# Patient Record
Sex: FEMALE | HISPANIC OR LATINO | Employment: FULL TIME | URBAN - METROPOLITAN AREA
[De-identification: names, ages, dates, MRNs, and addresses within clinical notes are randomized per-mention and may not be internally consistent; named-entity substitution may affect disease eponyms.]

---

## 2018-11-03 ENCOUNTER — HOSPITAL ENCOUNTER (OUTPATIENT)
Dept: LAB | Facility: MEDICAL CENTER | Age: 53
End: 2018-11-03
Attending: OBSTETRICS & GYNECOLOGY
Payer: COMMERCIAL

## 2018-11-03 LAB
25(OH)D3 SERPL-MCNC: 13 NG/ML (ref 30–100)
ALBUMIN SERPL BCP-MCNC: 4.5 G/DL (ref 3.2–4.9)
ALBUMIN/GLOB SERPL: 1.4 G/DL
ALP SERPL-CCNC: 64 U/L (ref 30–99)
ALT SERPL-CCNC: 28 U/L (ref 2–50)
ANION GAP SERPL CALC-SCNC: 8 MMOL/L (ref 0–11.9)
APPEARANCE UR: CLEAR
AST SERPL-CCNC: 19 U/L (ref 12–45)
BACTERIA #/AREA URNS HPF: NEGATIVE /HPF
BASOPHILS # BLD AUTO: 1.3 % (ref 0–1.8)
BASOPHILS # BLD: 0.07 K/UL (ref 0–0.12)
BILIRUB SERPL-MCNC: 0.5 MG/DL (ref 0.1–1.5)
BILIRUB UR QL STRIP.AUTO: NEGATIVE
BUN SERPL-MCNC: 14 MG/DL (ref 8–22)
CALCIUM SERPL-MCNC: 10.1 MG/DL (ref 8.5–10.5)
CHLORIDE SERPL-SCNC: 105 MMOL/L (ref 96–112)
CHOLEST SERPL-MCNC: 195 MG/DL (ref 100–199)
CO2 SERPL-SCNC: 29 MMOL/L (ref 20–33)
COLOR UR: YELLOW
CREAT SERPL-MCNC: 0.65 MG/DL (ref 0.5–1.4)
EOSINOPHIL # BLD AUTO: 0.09 K/UL (ref 0–0.51)
EOSINOPHIL NFR BLD: 1.7 % (ref 0–6.9)
EPI CELLS #/AREA URNS HPF: ABNORMAL /HPF
ERYTHROCYTE [DISTWIDTH] IN BLOOD BY AUTOMATED COUNT: 45.5 FL (ref 35.9–50)
ESTRADIOL SERPL-MCNC: <20 PG/ML
FSH SERPL-ACNC: 51.8 MIU/ML
GLOBULIN SER CALC-MCNC: 3.2 G/DL (ref 1.9–3.5)
GLUCOSE SERPL-MCNC: 95 MG/DL (ref 65–99)
GLUCOSE UR STRIP.AUTO-MCNC: NEGATIVE MG/DL
HCT VFR BLD AUTO: 46.6 % (ref 37–47)
HDLC SERPL-MCNC: 38 MG/DL
HGB BLD-MCNC: 14.9 G/DL (ref 12–16)
HYALINE CASTS #/AREA URNS LPF: ABNORMAL /LPF
IMM GRANULOCYTES # BLD AUTO: 0.01 K/UL (ref 0–0.11)
IMM GRANULOCYTES NFR BLD AUTO: 0.2 % (ref 0–0.9)
KETONES UR STRIP.AUTO-MCNC: NEGATIVE MG/DL
LDLC SERPL CALC-MCNC: 121 MG/DL
LEUKOCYTE ESTERASE UR QL STRIP.AUTO: ABNORMAL
LYMPHOCYTES # BLD AUTO: 2.35 K/UL (ref 1–4.8)
LYMPHOCYTES NFR BLD: 43.2 % (ref 22–41)
MCH RBC QN AUTO: 30.5 PG (ref 27–33)
MCHC RBC AUTO-ENTMCNC: 32 G/DL (ref 33.6–35)
MCV RBC AUTO: 95.5 FL (ref 81.4–97.8)
MICRO URNS: ABNORMAL
MONOCYTES # BLD AUTO: 0.44 K/UL (ref 0–0.85)
MONOCYTES NFR BLD AUTO: 8.1 % (ref 0–13.4)
NEUTROPHILS # BLD AUTO: 2.48 K/UL (ref 2–7.15)
NEUTROPHILS NFR BLD: 45.5 % (ref 44–72)
NITRITE UR QL STRIP.AUTO: NEGATIVE
NRBC # BLD AUTO: 0 K/UL
NRBC BLD-RTO: 0 /100 WBC
PH UR STRIP.AUTO: 6 [PH]
PLATELET # BLD AUTO: 216 K/UL (ref 164–446)
PMV BLD AUTO: 12.1 FL (ref 9–12.9)
POTASSIUM SERPL-SCNC: 4 MMOL/L (ref 3.6–5.5)
PROT SERPL-MCNC: 7.7 G/DL (ref 6–8.2)
PROT UR QL STRIP: NEGATIVE MG/DL
RBC # BLD AUTO: 4.88 M/UL (ref 4.2–5.4)
RBC # URNS HPF: ABNORMAL /HPF
RBC UR QL AUTO: NEGATIVE
SODIUM SERPL-SCNC: 142 MMOL/L (ref 135–145)
SP GR UR STRIP.AUTO: 1.02
T4 FREE SERPL-MCNC: 0.84 NG/DL (ref 0.53–1.43)
TRIGL SERPL-MCNC: 178 MG/DL (ref 0–149)
TSH SERPL DL<=0.005 MIU/L-ACNC: 1.32 UIU/ML (ref 0.38–5.33)
UROBILINOGEN UR STRIP.AUTO-MCNC: 0.2 MG/DL
WBC # BLD AUTO: 5.4 K/UL (ref 4.8–10.8)
WBC #/AREA URNS HPF: ABNORMAL /HPF

## 2018-11-03 PROCEDURE — 82306 VITAMIN D 25 HYDROXY: CPT

## 2018-11-03 PROCEDURE — 80061 LIPID PANEL: CPT

## 2018-11-03 PROCEDURE — 85025 COMPLETE CBC W/AUTO DIFF WBC: CPT

## 2018-11-03 PROCEDURE — 82670 ASSAY OF TOTAL ESTRADIOL: CPT

## 2018-11-03 PROCEDURE — 36415 COLL VENOUS BLD VENIPUNCTURE: CPT

## 2018-11-03 PROCEDURE — 80053 COMPREHEN METABOLIC PANEL: CPT

## 2018-11-03 PROCEDURE — 81001 URINALYSIS AUTO W/SCOPE: CPT

## 2018-11-03 PROCEDURE — 84439 ASSAY OF FREE THYROXINE: CPT

## 2018-11-03 PROCEDURE — 83001 ASSAY OF GONADOTROPIN (FSH): CPT

## 2018-11-03 PROCEDURE — 84443 ASSAY THYROID STIM HORMONE: CPT

## 2019-06-07 ENCOUNTER — HOSPITAL ENCOUNTER (EMERGENCY)
Dept: HOSPITAL 8 - ED | Age: 54
Discharge: HOME | End: 2019-06-07
Payer: COMMERCIAL

## 2019-06-07 VITALS — BODY MASS INDEX: 28.16 KG/M2 | WEIGHT: 158.95 LBS | HEIGHT: 63 IN

## 2019-06-07 VITALS — SYSTOLIC BLOOD PRESSURE: 118 MMHG | DIASTOLIC BLOOD PRESSURE: 82 MMHG
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
## 2019-06-07 DIAGNOSIS — S60.221A: Primary | ICD-10-CM
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
## 2019-06-07 PROCEDURE — 90715 TDAP VACCINE 7 YRS/> IM: CPT

## 2019-06-07 PROCEDURE — 90471 IMMUNIZATION ADMIN: CPT

## 2019-06-07 NOTE — NUR
PATIENT PRESENTS TO ED TODAY FOR SUPERFICIAL LAC TO RT HAND, DUE TO ACCIDENT AT 
WORK TODAY AT 0600. XRAY COMPLETED, AWAITING RESULTS. NADN, CALL LIGHT WITHIN 
REACH.

## 2021-04-25 ENCOUNTER — OFFICE VISIT (OUTPATIENT)
Dept: URGENT CARE | Facility: CLINIC | Age: 56
End: 2021-04-25
Payer: COMMERCIAL

## 2021-04-25 VITALS
DIASTOLIC BLOOD PRESSURE: 80 MMHG | HEIGHT: 63 IN | TEMPERATURE: 97.6 F | WEIGHT: 158.4 LBS | RESPIRATION RATE: 18 BRPM | BODY MASS INDEX: 28.07 KG/M2 | HEART RATE: 104 BPM | OXYGEN SATURATION: 100 % | SYSTOLIC BLOOD PRESSURE: 122 MMHG

## 2021-04-25 DIAGNOSIS — I88.9 CERVICAL LYMPHADENITIS: ICD-10-CM

## 2021-04-25 DIAGNOSIS — R19.7 DIARRHEA, UNSPECIFIED TYPE: ICD-10-CM

## 2021-04-25 DIAGNOSIS — J03.90 ACUTE TONSILLITIS, UNSPECIFIED ETIOLOGY: Primary | ICD-10-CM

## 2021-04-25 DIAGNOSIS — J02.9 SORE THROAT: ICD-10-CM

## 2021-04-25 PROCEDURE — 99214 OFFICE O/P EST MOD 30 MIN: CPT | Performed by: NURSE PRACTITIONER

## 2021-04-25 RX ORDER — METOPROLOL TARTRATE 50 MG/1
50 TABLET, FILM COATED ORAL 2 TIMES DAILY
Status: ON HOLD | COMMUNITY
End: 2024-02-15

## 2021-04-25 RX ORDER — AMOXICILLIN 500 MG/1
500 CAPSULE ORAL 2 TIMES DAILY
Qty: 20 CAPSULE | Refills: 0 | Status: SHIPPED | OUTPATIENT
Start: 2021-04-25 | End: 2021-05-05

## 2021-04-25 RX ORDER — MELOXICAM 15 MG/1
15 TABLET ORAL DAILY
Status: ON HOLD | COMMUNITY
End: 2024-02-15

## 2021-04-25 RX ORDER — AMITRIPTYLINE HYDROCHLORIDE 50 MG/1
50 TABLET, FILM COATED ORAL NIGHTLY
COMMUNITY

## 2021-04-25 NOTE — PROGRESS NOTES
Dilia Johnston is a 56 y.o. female who presents for Sore Throat (x3days, sore throat, white spots in throat, painful to swollow, diarrhea)    Greenlandic Language Line  # [id number]  name: Abner    LISA This is a new problem.  C/o sore throat, burning sensation. White spots in the back of her throat. Diarrhea today. Diarrhea occurred twice today.   No exposure to ill persons. She works at a school.   She has had both vaccinations for COVID infection and her influenza vaccination.  Treatment tried: increased fluids. No other aggravating or alleviating factors.     Review of Systems   Constitutional: Positive for chills, fever and malaise/fatigue.   HENT: Positive for sore throat. Negative for congestion and sinus pain.    Eyes: Negative for discharge and redness.   Respiratory: Negative for cough and shortness of breath.    Cardiovascular: Negative for chest pain, palpitations and leg swelling.   Gastrointestinal: Positive for diarrhea. Negative for abdominal pain, constipation, nausea and vomiting.   Genitourinary: Negative for dysuria.   Neurological: Negative for headaches.   Endo/Heme/Allergies: Negative for environmental allergies.   Psychiatric/Behavioral: Negative for substance abuse.       No Known Allergies  No past medical history on file.  Past Surgical History:   Procedure Laterality Date   • HYSTEROSCOPY WITH VIDEO DIAGNOSTIC  3/5/2009    [facility]   • DILATION AND CURETTAGE  3/5/2009    Performed by DANIELE PERDOMO at SURGERY SAME DAY [facility]     No family history on file.  Social History     Tobacco Use   • Smoking status: Never Smoker   • Smokeless tobacco: Never Used   Substance Use Topics   • Alcohol use: Not on file     There is no problem list on file for this patient.    Current Outpatient Medications on File Prior to Visit   Medication Sig Dispense Refill   • VITAMIN D PO Take  by mouth.     • metoprolol tartrate (LOPRESSOR) 50 MG  "Tab Take 50 mg by mouth 2 times a day.     • amitriptyline (ELAVIL) 50 MG Tab Take 50 mg by mouth every evening.     • meloxicam (MOBIC) 15 MG tablet Take 15 mg by mouth every day.       No current facility-administered medications on file prior to visit.          Objective:     /80 (BP Location: Left arm, Patient Position: Sitting, BP Cuff Size: Adult)   Pulse (!) 104   Temp 36.4 °C (97.6 °F) (Temporal)   Resp 18   Ht 1.61 m (5' 3.39\")   Wt 71.8 kg (158 lb 6.4 oz)   SpO2 100%   BMI 27.72 kg/m²     Physical Exam  Vitals and nursing note reviewed.   Constitutional:       General: She is not in acute distress.     Appearance: She is well-developed. She is not toxic-appearing.   HENT:      Head: Normocephalic.      Right Ear: Hearing, tympanic membrane, ear canal and external ear normal.      Left Ear: Hearing, tympanic membrane, ear canal and external ear normal.      Nose: Nose normal.      Right Sinus: No frontal sinus tenderness.      Left Sinus: No frontal sinus tenderness.      Mouth/Throat:      Pharynx: Uvula midline. Oropharyngeal exudate and posterior oropharyngeal erythema present.      Tonsils: No tonsillar abscesses.   Eyes:      General: Lids are normal.      Pupils: Pupils are equal, round, and reactive to light.   Neck:      Trachea: Trachea and phonation normal.   Cardiovascular:      Rate and Rhythm: Normal rate and regular rhythm.      Pulses: Normal pulses.   Pulmonary:      Effort: Pulmonary effort is normal. No respiratory distress.      Breath sounds: Normal breath sounds.   Abdominal:      Palpations: Abdomen is soft.   Musculoskeletal:         General: Normal range of motion.      Cervical back: Full passive range of motion without pain, normal range of motion and neck supple.   Lymphadenopathy:      Head:      Right side of head: Tonsillar adenopathy present.      Left side of head: Tonsillar adenopathy present.      Cervical: No cervical adenopathy.      Upper Body:      Right " upper body: No supraclavicular adenopathy.      Left upper body: No supraclavicular adenopathy.   Skin:     General: Skin is warm and dry.   Neurological:      Mental Status: She is alert and oriented to person, place, and time.      Deep Tendon Reflexes: Reflexes are normal and symmetric.   Psychiatric:         Speech: Speech normal.         Behavior: Behavior normal.       Strep : negative     Assessment /Associated Orders:      1. Acute tonsillitis, unspecified etiology  amoxicillin (AMOXIL) 500 MG Cap   2. Sore throat     3. Cervical lymphadenitis     4. Diarrhea, unspecified type             Medical Decision Making:    Pt is clinically stable at today's acute urgent care visit.  No acute distress noted. Appropriate for outpatient management at this time.     OTC pepto bismol or immodium AD for diarrhea > 5 / day.   Resume all prior  RX medications. Take as prescribed.     Salt water gargles BID and prn. Suggested 1/4 to 1/2 teaspoon (1.5 to 3.0 g) of salt per one cup (8 ounces or 250 mL) of warm water.   OTC throat analgesic spray or lozenge of choice prn throat pain. Dosage and directions per       Educated in proper administration of medication(s) ordered today including safety, possible SE, risks, benefits, rationale and alternatives to therapy.       Advised the patient to follow-up with the primary care provider for recheck, reevaluation, and consideration of further management if necessary.   Discussed management options (risks,benefits, and alternatives to treatment). Pt expresses understanding and the treatment plan was agreed upon. Questions were encouraged and answered       Return to urgent care prn if new or worsening sx or if there is no improvement in condition prn . Red flags discussed and indications to immediately call 911 or present to the Emergency Department.     I personally reviewed prior external notes and test results pertinent to today's visit.  I have independently reviewed  and interpreted all diagnostics ordered during this urgent care acute visit.   Time spent evaluating this patient was at least 30 minutes and includes preparing for visit, counseling/education, exam and evaluation, obtaining history, independent interpretation, ordering lab/test/procedures,medication management and documentation.

## 2021-04-26 ASSESSMENT — ENCOUNTER SYMPTOMS
EYE DISCHARGE: 0
PALPITATIONS: 0
VOMITING: 0
DIARRHEA: 1
EYE REDNESS: 0
CHILLS: 1
COUGH: 0
ABDOMINAL PAIN: 0
SINUS PAIN: 0
FEVER: 1
HEADACHES: 0
SORE THROAT: 1
SHORTNESS OF BREATH: 0
CONSTIPATION: 0
NAUSEA: 0

## 2021-04-26 ASSESSMENT — LIFESTYLE VARIABLES: SUBSTANCE_ABUSE: 0

## 2024-01-11 ENCOUNTER — HOSPITAL ENCOUNTER (OUTPATIENT)
Dept: RADIOLOGY | Facility: MEDICAL CENTER | Age: 59
End: 2024-01-11
Payer: COMMERCIAL

## 2024-01-15 NOTE — PROGRESS NOTES
Neuro Interventional Service Consultation      Re: Dilia Johnston     MRN: [id number]   : 1965    Dilia Johnston is a 58 y.o. female seen in clinic for evaluation and possible intracranial aneurysm intervention.     Referring provider/ neurosurgeon: Lizette GONZALEZ  PCP: Caio Fernandez MD  Cardiologist: Eddie Lambert MD     services were used in the patient's primary language of Latvian.     Name or Number: Tita [id number]  Mode of interpretation: iPad    Content of Interpretation:  Consultation for intracranial aneurysm repair     History of Present Illness:  Initial consultation 24:  presents with an incidental left ICA terminus aneurysm. She has a history of migraine headache that have increased in frequency during menopause. She describes one severe headache during a stress test, for which she was evaluated at [facility] in 2022. It was more severe than her chronic migraines. Imaging revealed an incidental left ICA terminus aneurysm about 6- 7 mm in size. She has been evaluated by neurosurgery and the patient has been referred to the Neuro Interventional Service for evaluation and management of this finding.     She is seen today for review of imaging studies and discussion of possible intracranial aneurysm intervention. Today, the patient reports headaches managed with daily Tylenol and ibuprofen. She describes her headaches are frontal and base of neck. She reports a seizure after an IV medication about 2 years ago, no subsequent seizures. She reports a MI about 2 years ago, and underwent a coronary angiogram with no intervention. She has been on medical management since that time. She denies current angina or other heart symptoms. There is no family history of brain aneurysm. Blood pressure is controlled with medications. There is no history of smoking. She denies current or past methamphetamine use. The patient has never anesthesia in the past. The patient has taken DAPT in  the past and reports easy bruising, but denies any history of major bleeding, including epistaxis, hemoptysis, hematemesis, gross hematuria, and melena. 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 She is accompanied by a support person Shamar to today's consultation.    Family history of aneurysm: no  Hyperlipidemia: yes, on medication  Hypertension: yes, on medication  Smoking: no  Diabetes Mellitus: no    No past medical history on file.  Past Surgical History:   Procedure Laterality Date    HYSTEROSCOPY WITH VIDEO DIAGNOSTIC  3/5/2009    Performed by DANIELE PERDOMO at SURGERY SAME DAY [facility] ORS    DILATION AND CURETTAGE  3/5/2009    Performed by DANIELE PERDOMO at SURGERY SAME DAY [facility] ORS     Social History     Socioeconomic History    Marital status:      Spouse name: Not on file    Number of children: Not on file    Years of education: Not on file    Highest education level: Not on file   Occupational History    Not on file   Tobacco Use    Smoking status: Never    Smokeless tobacco: Never   Vaping Use    Vaping Use: Never used   Substance and Sexual Activity    Alcohol use: Not on file    Drug use: Not on file    Sexual activity: Not on file   Other Topics Concern    Not on file   Social History Narrative    ** Merged History Encounter **          Social Determinants of Health     Financial Resource Strain: Not on file   Food Insecurity: Not on file   Transportation Needs: Not on file   Physical Activity: Not on file   Stress: Not on file   Social Connections: Not on file   Intimate Partner Violence: Not on file   Housing Stability: Not on file     No family history on file.    Review of Systems   Constitutional: Negative.  Negative for chills, diaphoresis, fever, malaise/fatigue and weight loss.   HENT:  Negative for nosebleeds.    Respiratory: Negative.  Negative for hemoptysis.    Cardiovascular: Negative.  Negative for chest pain.   Gastrointestinal: Negative.   Negative for blood in stool and melena.   Skin: Negative.    Neurological:  Positive for headaches. Negative for sensory change, speech change, focal weakness and weakness.   Psychiatric/Behavioral:  Negative for substance abuse.      A comprehensive 14-point review of systems was negative except as described above.     Labs:   No results available for review    Radiology:   [facility] Head [facility]:          CT Head w/o 6/16/22 at [facility]:        Current Outpatient Medications   Medication Sig Dispense Refill    VITAMIN D PO Take  by mouth.      metoprolol tartrate (LOPRESSOR) 50 MG Tab Take 50 mg by mouth 2 times a day.      amitriptyline (ELAVIL) 50 MG Tab Take 50 mg by mouth every evening.      meloxicam (MOBIC) 15 MG tablet Take 15 mg by mouth every day.       No current facility-administered medications for this visit.       No Known Allergies    Physical Exam  Constitutional:       General: She is not in acute distress.     Appearance: She is not diaphoretic.   HENT:      Head: Normocephalic.   Eyes:      General: No scleral icterus.  Pulmonary:      Effort: Pulmonary effort is normal. No respiratory distress.   Abdominal:      General: There is no distension.   Skin:     General: Skin is warm and dry.      Coloration: Skin is not pale.      Findings: No erythema or rash.   Neurological:      General: No focal deficit present.      Mental Status: She is alert and oriented to person, place, and time. She is not disoriented.      Cranial Nerves: No cranial nerve deficit or facial asymmetry.      Sensory: Sensation is intact.      Motor: No weakness or tremor.      Coordination: Coordination normal.      Gait: Gait is intact. Gait normal.   Psychiatric:         Mood and Affect: Mood and affect normal.         Behavior: Behavior normal.         Thought Content: Thought content normal.         Cognition and Memory: Memory normal.         Judgment: Judgment normal.     PHASES Aneurysm  Risk Score: 2    The 5-year absolute risk of rupture based on score is currently estimated to be:    ?2 points: 0.4%  3 points: 0.7%  4 points: 0.9%  5 points: 1.3%  6 points: 1.7%  7 points: 2.4%  8 points: 3.2%  9 points: 4.3%  10 points: 5.3%  11 points: 7.2%  ? 12 points: 17.8%    Impression:   1. 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  2. Chronic headaches.  3. Coronary artery disease, status post myocardial infarction.  4. Hypertension, controlled.  5. Hyperlipidemia.    Plan:   Zana Lemons MD has reviewed 's history and imaging studies, examined the patient, and discussed treatment options.  is a candidate for embolization of this incidental asymptomatic intracranial aneurysm. Aneurysms of this size and in this location carry a statistical cumulative 5 year rupture risk of < 1 %, however we explained that even small aneurysms can rupture. Overall procedure risk is approximately 3%. We discussed the method of the procedure at length including the possibility of the use of catheters, contrast, and xrays to facilitate diagnostic procedures and stents and embolic agents to perform endovascular intervention. We additionally discussed the procedure risks, including bleeding and infection, damage to the arteries, reaction to any medications given during the procedure, side effects of contrast, radiation exposure, in stent stenosis, coil or stent migration, mechanical failure, stroke, hemorrhage, and death. There is a risk for unanticipated neurologic or non neurologic complications. There is a chance the aneurysm may ultimately not be amenable to endovascular intervention. After the procedure, there is a chance that the aneurysm could recur. We reviewed known risk factors for vascular health that include hypertension, hyperglycemia, hyperlipidemia, and tobacco use, and modifiable risk factors were discussed. We explained that the patient will need to have ongoing  surveillance imaging after the procedure, which will be managed by us, and that future treatment depends on multiple factors including lab studies, imaging, and performance status. We discussed alternatives to the procedure including surveillance and surgical intervention, which has been discussed with her neurosurgeon. The patient verbalizes understanding of risks, benefits, and alternatives and elects to proceed. We discussed the need for DAPT with aspirin and Plavix prior to the procedure and for up to 6 months post procedure if a device is implanted. Side effects of antiplatelet therapy, specifically bleeding, were discussed with instructions given should the patient develop minor or major bleeding. She was instructed to avoid ibuprofen while on DAPT. Written pre- and post- procedure care instructions were provided. We discussed signs of a sentinel headache and stroke with instructions to call an ambulance for the onset of these symptoms. The patient has been scheduled for 2/15/24.    Dilia Johnston is a 58 y.o. female scheduled for intracranial aneurysm repair. This surgery will be higher risk surgery due to risk of intracranial hemorrhage or thrombotic events. Care provided will be in the Intensive Care Unit. Post procedurally, this patient will require every 1 hour nursing interventions and physician evaluations more than 3-4 times a day. The care needed cannot be provided in outpatient setting and without such care there is high risk of development of complications and death.     CARLOS Zhong with Zana Lemons MD  Neuro Interventional Service   [address] ([address])  [place] [address]  [phone]

## 2024-01-16 ENCOUNTER — HOSPITAL ENCOUNTER (OUTPATIENT)
Dept: RADIOLOGY | Facility: MEDICAL CENTER | Age: 59
End: 2024-01-16
Payer: COMMERCIAL

## 2024-01-16 DIAGNOSIS — I67.1 CEREBRAL ANEURYSM, NONRUPTURED: ICD-10-CM

## 2024-01-16 RX ORDER — CLOPIDOGREL BISULFATE 75 MG/1
75 TABLET ORAL DAILY
Qty: 30 TABLET | Refills: 5 | Status: SHIPPED | OUTPATIENT
Start: 2024-01-16 | End: 2024-07-14

## 2024-01-16 RX ORDER — ASPIRIN 81 MG/1
81 TABLET ORAL DAILY
Qty: 90 TABLET | Refills: 1 | Status: SHIPPED | OUTPATIENT
Start: 2024-01-16 | End: 2024-07-14

## 2024-01-16 ASSESSMENT — ENCOUNTER SYMPTOMS
SPEECH CHANGE: 0
CHILLS: 0
FEVER: 0
DIAPHORESIS: 0
BLOOD IN STOOL: 0
WEAKNESS: 0
WEIGHT LOSS: 0
CARDIOVASCULAR NEGATIVE: 1
HEADACHES: 1
FOCAL WEAKNESS: 0
SENSORY CHANGE: 0
GASTROINTESTINAL NEGATIVE: 1
CONSTITUTIONAL NEGATIVE: 1
RESPIRATORY NEGATIVE: 1
HEMOPTYSIS: 0

## 2024-01-16 ASSESSMENT — LIFESTYLE VARIABLES: SUBSTANCE_ABUSE: 0

## 2024-01-22 ENCOUNTER — APPOINTMENT (OUTPATIENT)
Dept: ADMISSIONS | Facility: MEDICAL CENTER | Age: 59
DRG: 027 | End: 2024-01-22
Attending: RADIOLOGY
Payer: COMMERCIAL

## 2024-01-27 ENCOUNTER — OFFICE VISIT (OUTPATIENT)
Dept: URGENT CARE | Facility: CLINIC | Age: 59
End: 2024-01-27
Payer: COMMERCIAL

## 2024-01-27 VITALS
DIASTOLIC BLOOD PRESSURE: 60 MMHG | HEART RATE: 85 BPM | RESPIRATION RATE: 18 BRPM | SYSTOLIC BLOOD PRESSURE: 128 MMHG | OXYGEN SATURATION: 96 % | BODY MASS INDEX: 28 KG/M2 | WEIGHT: 158 LBS | TEMPERATURE: 96.8 F | HEIGHT: 63 IN

## 2024-01-27 DIAGNOSIS — J06.9 VIRAL UPPER RESPIRATORY ILLNESS: ICD-10-CM

## 2024-01-27 DIAGNOSIS — R05.1 ACUTE COUGH: ICD-10-CM

## 2024-01-27 PROCEDURE — 3074F SYST BP LT 130 MM HG: CPT | Performed by: NURSE PRACTITIONER

## 2024-01-27 PROCEDURE — 99213 OFFICE O/P EST LOW 20 MIN: CPT | Performed by: NURSE PRACTITIONER

## 2024-01-27 PROCEDURE — 3078F DIAST BP <80 MM HG: CPT | Performed by: NURSE PRACTITIONER

## 2024-01-27 RX ORDER — BENZONATATE 100 MG/1
100 CAPSULE ORAL 3 TIMES DAILY PRN
Qty: 60 CAPSULE | Refills: 0 | Status: ON HOLD | OUTPATIENT
Start: 2024-01-27 | End: 2024-02-15

## 2024-01-27 RX ORDER — DEXTROMETHORPHAN HYDROBROMIDE AND PROMETHAZINE HYDROCHLORIDE 15; 6.25 MG/5ML; MG/5ML
5 SYRUP ORAL EVERY 4 HOURS PRN
Qty: 120 ML | Refills: 0 | Status: ON HOLD | OUTPATIENT
Start: 2024-01-27 | End: 2024-02-15

## 2024-01-27 RX ORDER — LISINOPRIL 5 MG/1
1 TABLET ORAL DAILY
COMMUNITY

## 2024-01-27 RX ORDER — ATORVASTATIN CALCIUM 40 MG/1
1 TABLET, FILM COATED ORAL DAILY
COMMUNITY

## 2024-01-27 NOTE — PROGRESS NOTES
"Dilia Johnston is a 59 y.o. female who presents for Cough (X7 days), Nasal Congestion, and Pharyngitis      Vincentian Language Line  : Kavya [id number]    HPI  This is a new problem. Dilia Johnston is a 59 y.o. patient who presents to urgent care with c/o:   She has been sick for a month . Sx improved but then increased cough. Now coughing for 7 days.  Cough is worse now. Nasal congestion and sore throat.  Feels a 'galindo feeling' in her throat that makes her cough. White phelgm that is a little green sometimes.  Feels a little sob when she is about to cough and tries hard  not to cough. Denies fever, sob. NVD, ear pain.   Tx tried: Robitussin Cough and sore throat   Has surgery on Feb 15 for 'bubble in her head\".     ROS See HPI    Allergies:     No Known Allergies    PMSFS Hx:  History reviewed. No pertinent past medical history.  Past Surgical History:   Procedure Laterality Date    HYSTEROSCOPY WITH VIDEO DIAGNOSTIC  3/5/2009    Performed by DANIELE PERDOMO at SURGERY SAME DAY [facility] ORS    DILATION AND CURETTAGE  3/5/2009    Performed by DANIELE PERDOMO at SURGERY SAME DAY [facility] ORS     History reviewed. No pertinent family history.  Social History     Tobacco Use    Smoking status: Never    Smokeless tobacco: Never   Substance Use Topics    Alcohol use: Not on file       Problems:   There is no problem list on file for this patient.      Medications:   Current Outpatient Medications on File Prior to Visit   Medication Sig Dispense Refill    lisinopril (PRINIVIL) 5 MG Tab Take 1 Tablet by mouth every day.      atorvastatin (LIPITOR) 40 MG Tab Take 1 Tablet by mouth every day.      clopidogrel (PLAVIX) 75 MG Tab Take 1 Tablet by mouth every day for 180 days. 30 Tablet 5    aspirin (ASPIRIN 81) 81 MG EC tablet Take 1 Tablet by mouth every day for 180 days. 90 Tablet 1    VITAMIN D PO Take  by mouth.      metoprolol tartrate (LOPRESSOR) 50 MG Tab Take 50 mg by mouth 2 times a day.      " "amitriptyline (ELAVIL) 50 MG Tab Take 50 mg by mouth every evening.      meloxicam (MOBIC) 15 MG tablet Take 15 mg by mouth every day.       No current facility-administered medications on file prior to visit.        Objective:     /60 (BP Location: Left arm, Patient Position: Sitting, BP Cuff Size: Adult)   Pulse 85   Temp 36 °C (96.8 °F) (Temporal)   Resp 18   Ht 1.6 m (5' 3\")   Wt 71.7 kg (158 lb)   LMP 01/22/2009   SpO2 96%   BMI 27.99 kg/m²     Physical Exam  Vitals and nursing note reviewed.   Constitutional:       General: She is not in acute distress.     Appearance: Normal appearance. She is well-developed. She is not ill-appearing or toxic-appearing.   HENT:      Head: Normocephalic.      Right Ear: Hearing, tympanic membrane, ear canal and external ear normal.      Left Ear: Hearing, ear canal and external ear normal.  No middle ear effusion. Tympanic membrane is injected. Tympanic membrane is not erythematous.      Nose: No mucosal edema, congestion or rhinorrhea.      Right Sinus: No maxillary sinus tenderness or frontal sinus tenderness.      Left Sinus: No maxillary sinus tenderness or frontal sinus tenderness.      Mouth/Throat:      Mouth: Mucous membranes are moist.      Pharynx: Uvula midline. Posterior oropharyngeal erythema present.      Tonsils: No tonsillar abscesses.   Neck:      Trachea: Trachea normal.   Cardiovascular:      Rate and Rhythm: Normal rate and regular rhythm.      Chest Wall: PMI is not displaced.      Pulses: Normal pulses.      Heart sounds: Normal heart sounds.   Pulmonary:      Effort: Pulmonary effort is normal. No respiratory distress.      Breath sounds: Normal breath sounds. No decreased breath sounds, wheezing, rhonchi or rales.   Musculoskeletal:         General: Normal range of motion.      Cervical back: Full passive range of motion without pain, normal range of motion and neck supple.   Lymphadenopathy:      Cervical: No cervical adenopathy.      " Upper Body:      Right upper body: No supraclavicular adenopathy.      Left upper body: No supraclavicular adenopathy.   Skin:     General: Skin is warm and dry.      Capillary Refill: Capillary refill takes less than 2 seconds.   Neurological:      Mental Status: She is alert and oriented to person, place, and time.      Gait: Gait normal.   Psychiatric:         Mood and Affect: Mood normal.         Speech: Speech normal.         Behavior: Behavior normal. Behavior is cooperative.         Assessment /Associated Orders:      1. Acute cough  benzonatate (TESSALON) 100 MG Cap    promethazine-dextromethorphan (PROMETHAZINE-DM) 6.25-15 MG/5ML syrup      2. Viral upper respiratory illness              Medical Decision Making:    Ma is a very pleasant 59 y.o. female who is clinically stable at today's acute urgent care visit.  No acute distress noted.  VSS. Appropriate for outpatient care at this time.   Acute problem today with uncertain prognosis.   Educated in proper administration of  prescription medication(s) ordered today including safety, possible SE, risks, benefits, rationale and alternatives to therapy.   Educated not to drive, drink alcohol, operate machinery, or do anything that requires mental alertness while taking  promethazine-DM RX medication that has sedation/ drowsiness as a side effect.  Do not combine this medication with other cough and cold medications.  Allow an 8-hour wear off time before participating in any of these activities.  Keep well hydrated   Cool mist humidifier at night prn     Discussed Dx, management options (risks,benefits, and alternatives to planned treatment), natural progression and supportive care.  Expressed understanding and the treatment plan was agreed upon.   Questions were encouraged and answered   Return to urgent care prn if new or worsening sx or if there is no improvement in condition prn.    Educated in Red flags and indications to immediately call 911 or present to  the Emergency Department.           Please note that this dictation was created using voice recognition software. I have worked with consultants from the vendor as well as technical experts from [employer] [facility] to optimize the interface. I have made every reasonable attempt to correct obvious errors, but I expect that there are errors of grammar and possibly content that I did not discover before finalizing the note.  This note was electronically signed by provider

## 2024-01-29 ENCOUNTER — PRE-ADMISSION TESTING (OUTPATIENT)
Dept: ADMISSIONS | Facility: MEDICAL CENTER | Age: 59
DRG: 027 | End: 2024-01-29
Attending: RADIOLOGY
Payer: COMMERCIAL

## 2024-01-30 NOTE — OR NURSING
Patient states she has been instructed to start Plavix/ASA 5 days prior to procedure. Note of instruction to start Plavix and ASA, though specific start date not noted. TEAMS message sent to Miguel GONZALEZ for awareness/clarification for patient.

## 2024-02-07 ENCOUNTER — PRE-ADMISSION TESTING (OUTPATIENT)
Dept: ADMISSIONS | Facility: MEDICAL CENTER | Age: 59
DRG: 027 | End: 2024-02-07
Attending: RADIOLOGY
Payer: COMMERCIAL

## 2024-02-07 DIAGNOSIS — Z01.812 PRE-OPERATIVE LABORATORY EXAMINATION: ICD-10-CM

## 2024-02-07 LAB
ANION GAP SERPL CALC-SCNC: 8 MMOL/L (ref 7–16)
BUN SERPL-MCNC: 16 MG/DL (ref 8–22)
CALCIUM SERPL-MCNC: 9.2 MG/DL (ref 8.5–10.5)
CHLORIDE SERPL-SCNC: 104 MMOL/L (ref 96–112)
CO2 SERPL-SCNC: 27 MMOL/L (ref 20–33)
CREAT SERPL-MCNC: 0.7 MG/DL (ref 0.5–1.4)
ERYTHROCYTE [DISTWIDTH] IN BLOOD BY AUTOMATED COUNT: 45.5 FL (ref 35.9–50)
GFR SERPLBLD CREATININE-BSD FMLA CKD-EPI: 100 ML/MIN/1.73 M 2
GLUCOSE SERPL-MCNC: 103 MG/DL (ref 65–99)
HCT VFR BLD AUTO: 45.4 % (ref 37–47)
HGB BLD-MCNC: 14.8 G/DL (ref 12–16)
INR PPP: 0.99 (ref 0.87–1.13)
MCH RBC QN AUTO: 31.2 PG (ref 27–33)
MCHC RBC AUTO-ENTMCNC: 32.6 G/DL (ref 32.2–35.5)
MCV RBC AUTO: 95.6 FL (ref 81.4–97.8)
PLATELET # BLD AUTO: 176 K/UL (ref 164–446)
PMV BLD AUTO: 11.9 FL (ref 9–12.9)
POTASSIUM SERPL-SCNC: 4.4 MMOL/L (ref 3.6–5.5)
PROTHROMBIN TIME: 13.2 SEC (ref 12–14.6)
RBC # BLD AUTO: 4.75 M/UL (ref 4.2–5.4)
SODIUM SERPL-SCNC: 139 MMOL/L (ref 135–145)
WBC # BLD AUTO: 6.7 K/UL (ref 4.8–10.8)

## 2024-02-07 PROCEDURE — 36415 COLL VENOUS BLD VENIPUNCTURE: CPT

## 2024-02-07 PROCEDURE — 80048 BASIC METABOLIC PNL TOTAL CA: CPT

## 2024-02-07 PROCEDURE — 85027 COMPLETE CBC AUTOMATED: CPT

## 2024-02-07 PROCEDURE — 85610 PROTHROMBIN TIME: CPT

## 2024-02-15 ENCOUNTER — ANESTHESIA (OUTPATIENT)
Dept: RADIOLOGY | Facility: MEDICAL CENTER | Age: 59
DRG: 027 | End: 2024-02-15
Payer: COMMERCIAL

## 2024-02-15 ENCOUNTER — HOSPITAL ENCOUNTER (INPATIENT)
Facility: MEDICAL CENTER | Age: 59
LOS: 1 days | DRG: 027 | End: 2024-02-16
Attending: RADIOLOGY | Admitting: RADIOLOGY
Payer: COMMERCIAL

## 2024-02-15 ENCOUNTER — APPOINTMENT (OUTPATIENT)
Dept: RADIOLOGY | Facility: MEDICAL CENTER | Age: 59
DRG: 027 | End: 2024-02-15
Attending: RADIOLOGY
Payer: COMMERCIAL

## 2024-02-15 ENCOUNTER — ANESTHESIA EVENT (OUTPATIENT)
Dept: RADIOLOGY | Facility: MEDICAL CENTER | Age: 59
DRG: 027 | End: 2024-02-15
Payer: COMMERCIAL

## 2024-02-15 DIAGNOSIS — I67.1 CEREBRAL ANEURYSM, NONRUPTURED: ICD-10-CM

## 2024-02-15 PROBLEM — E78.5 HLD (HYPERLIPIDEMIA): Status: ACTIVE | Noted: 2024-02-15

## 2024-02-15 PROBLEM — I10 HTN (HYPERTENSION): Status: ACTIVE | Noted: 2024-02-15

## 2024-02-15 LAB
CFT BLD TEG: 5.8 MIN (ref 4.6–9.1)
CFT P HPASE BLD TEG: 6.4 MIN (ref 4.3–8.3)
CLOT ANGLE BLD TEG: 68.3 DEGREES (ref 63–78)
CLOT LYSIS 30M P MA LENFR BLD TEG: 1.4 % (ref 0–2.6)
CT.EXTRINSIC BLD ROTEM: 1.6 MIN (ref 0.8–2.1)
EKG IMPRESSION: NORMAL
MCF BLD TEG: 57.9 MM (ref 52–69)
MCF.PLATELET INHIB BLD ROTEM: 19 MM (ref 15–32)
PA AA BLD-ACNC: 94.2 % (ref 0–11)
PA ADP BLD-ACNC: 69.4 % (ref 0–17)
TEG ALGORITHM TGALG: ABNORMAL

## 2024-02-15 PROCEDURE — 770022 HCHG ROOM/CARE - ICU (200)

## 2024-02-15 PROCEDURE — 4410465 IR-EMBOLIZE-NEURO-INTRACRANIAL

## 2024-02-15 PROCEDURE — 03LG3DZ OCCLUSION OF INTRACRANIAL ARTERY WITH INTRALUMINAL DEVICE, PERCUTANEOUS APPROACH: ICD-10-PCS | Performed by: RADIOLOGY

## 2024-02-15 PROCEDURE — 700101 HCHG RX REV CODE 250: Performed by: ANESTHESIOLOGY

## 2024-02-15 PROCEDURE — 160002 HCHG RECOVERY MINUTES (STAT)

## 2024-02-15 PROCEDURE — A9270 NON-COVERED ITEM OR SERVICE: HCPCS | Performed by: NURSE PRACTITIONER

## 2024-02-15 PROCEDURE — 700102 HCHG RX REV CODE 250 W/ 637 OVERRIDE(OP): Performed by: ANESTHESIOLOGY

## 2024-02-15 PROCEDURE — 160036 HCHG PACU - EA ADDL 30 MINS PHASE I

## 2024-02-15 PROCEDURE — 99291 CRITICAL CARE FIRST HOUR: CPT | Performed by: NURSE PRACTITIONER

## 2024-02-15 PROCEDURE — 93005 ELECTROCARDIOGRAM TRACING: CPT | Performed by: ANESTHESIOLOGY

## 2024-02-15 PROCEDURE — 93010 ELECTROCARDIOGRAM REPORT: CPT | Performed by: INTERNAL MEDICINE

## 2024-02-15 PROCEDURE — 85576 BLOOD PLATELET AGGREGATION: CPT | Mod: 91

## 2024-02-15 PROCEDURE — 160035 HCHG PACU - 1ST 60 MINS PHASE I

## 2024-02-15 PROCEDURE — 700102 HCHG RX REV CODE 250 W/ 637 OVERRIDE(OP): Performed by: NURSE PRACTITIONER

## 2024-02-15 PROCEDURE — 85347 COAGULATION TIME ACTIVATED: CPT

## 2024-02-15 PROCEDURE — 700111 HCHG RX REV CODE 636 W/ 250 OVERRIDE (IP): Mod: JZ | Performed by: ANESTHESIOLOGY

## 2024-02-15 PROCEDURE — A9270 NON-COVERED ITEM OR SERVICE: HCPCS | Performed by: ANESTHESIOLOGY

## 2024-02-15 PROCEDURE — 700105 HCHG RX REV CODE 258: Performed by: RADIOLOGY

## 2024-02-15 PROCEDURE — 700117 HCHG RX CONTRAST REV CODE 255: Performed by: RADIOLOGY

## 2024-02-15 PROCEDURE — 85384 FIBRINOGEN ACTIVITY: CPT

## 2024-02-15 RX ORDER — OXYCODONE HCL 5 MG/5 ML
10 SOLUTION, ORAL ORAL
Status: COMPLETED | OUTPATIENT
Start: 2024-02-15 | End: 2024-02-15

## 2024-02-15 RX ORDER — HYDRALAZINE HYDROCHLORIDE 20 MG/ML
5 INJECTION INTRAMUSCULAR; INTRAVENOUS
Status: DISCONTINUED | OUTPATIENT
Start: 2024-02-15 | End: 2024-02-15 | Stop reason: HOSPADM

## 2024-02-15 RX ORDER — ACETAMINOPHEN 325 MG/1
650 TABLET ORAL EVERY 6 HOURS PRN
Status: DISCONTINUED | OUTPATIENT
Start: 2024-02-15 | End: 2024-02-16 | Stop reason: HOSPADM

## 2024-02-15 RX ORDER — METOPROLOL SUCCINATE 25 MG/1
25 TABLET, EXTENDED RELEASE ORAL DAILY
COMMUNITY

## 2024-02-15 RX ORDER — SODIUM CHLORIDE, SODIUM LACTATE, POTASSIUM CHLORIDE, CALCIUM CHLORIDE 600; 310; 30; 20 MG/100ML; MG/100ML; MG/100ML; MG/100ML
INJECTION, SOLUTION INTRAVENOUS CONTINUOUS
Status: ACTIVE | OUTPATIENT
Start: 2024-02-15 | End: 2024-02-15

## 2024-02-15 RX ORDER — ACETAMINOPHEN 325 MG/1
650 TABLET ORAL
COMMUNITY

## 2024-02-15 RX ORDER — PROMETHAZINE HYDROCHLORIDE 25 MG/1
12.5-25 SUPPOSITORY RECTAL EVERY 4 HOURS PRN
Status: DISCONTINUED | OUTPATIENT
Start: 2024-02-15 | End: 2024-02-16 | Stop reason: HOSPADM

## 2024-02-15 RX ORDER — SODIUM CHLORIDE, SODIUM LACTATE, POTASSIUM CHLORIDE, CALCIUM CHLORIDE 600; 310; 30; 20 MG/100ML; MG/100ML; MG/100ML; MG/100ML
INJECTION, SOLUTION INTRAVENOUS CONTINUOUS
Status: DISCONTINUED | OUTPATIENT
Start: 2024-02-15 | End: 2024-02-15 | Stop reason: HOSPADM

## 2024-02-15 RX ORDER — MIDAZOLAM HYDROCHLORIDE 1 MG/ML
INJECTION INTRAMUSCULAR; INTRAVENOUS PRN
Status: DISCONTINUED | OUTPATIENT
Start: 2024-02-15 | End: 2024-02-15 | Stop reason: HOSPADM

## 2024-02-15 RX ORDER — HALOPERIDOL 5 MG/ML
1 INJECTION INTRAMUSCULAR
Status: DISCONTINUED | OUTPATIENT
Start: 2024-02-15 | End: 2024-02-15 | Stop reason: HOSPADM

## 2024-02-15 RX ORDER — ONDANSETRON 4 MG/1
4 TABLET, ORALLY DISINTEGRATING ORAL EVERY 4 HOURS PRN
Status: DISCONTINUED | OUTPATIENT
Start: 2024-02-15 | End: 2024-02-16 | Stop reason: HOSPADM

## 2024-02-15 RX ORDER — PROMETHAZINE HYDROCHLORIDE 25 MG/1
12.5-25 TABLET ORAL EVERY 4 HOURS PRN
Status: DISCONTINUED | OUTPATIENT
Start: 2024-02-15 | End: 2024-02-16 | Stop reason: HOSPADM

## 2024-02-15 RX ORDER — ATORVASTATIN CALCIUM 40 MG/1
40 TABLET, FILM COATED ORAL DAILY
Status: DISCONTINUED | OUTPATIENT
Start: 2024-02-15 | End: 2024-02-16 | Stop reason: HOSPADM

## 2024-02-15 RX ORDER — EPHEDRINE SULFATE 50 MG/ML
INJECTION, SOLUTION INTRAVENOUS PRN
Status: DISCONTINUED | OUTPATIENT
Start: 2024-02-15 | End: 2024-02-15 | Stop reason: SURG

## 2024-02-15 RX ORDER — ONDANSETRON 2 MG/ML
4 INJECTION INTRAMUSCULAR; INTRAVENOUS
Status: COMPLETED | OUTPATIENT
Start: 2024-02-15 | End: 2024-02-15

## 2024-02-15 RX ORDER — HYDRALAZINE HYDROCHLORIDE 20 MG/ML
10 INJECTION INTRAMUSCULAR; INTRAVENOUS EVERY 4 HOURS PRN
Status: DISCONTINUED | OUTPATIENT
Start: 2024-02-15 | End: 2024-02-16 | Stop reason: HOSPADM

## 2024-02-15 RX ORDER — MIDAZOLAM HYDROCHLORIDE 1 MG/ML
INJECTION INTRAMUSCULAR; INTRAVENOUS
Status: COMPLETED
Start: 2024-02-15 | End: 2024-02-15

## 2024-02-15 RX ORDER — POLYETHYLENE GLYCOL 3350 17 G/17G
1 POWDER, FOR SOLUTION ORAL
Status: DISCONTINUED | OUTPATIENT
Start: 2024-02-15 | End: 2024-02-16 | Stop reason: HOSPADM

## 2024-02-15 RX ORDER — ONDANSETRON 2 MG/ML
4 INJECTION INTRAMUSCULAR; INTRAVENOUS EVERY 4 HOURS PRN
Status: DISCONTINUED | OUTPATIENT
Start: 2024-02-15 | End: 2024-02-16 | Stop reason: HOSPADM

## 2024-02-15 RX ORDER — DEXAMETHASONE SODIUM PHOSPHATE 4 MG/ML
INJECTION, SOLUTION INTRA-ARTICULAR; INTRALESIONAL; INTRAMUSCULAR; INTRAVENOUS; SOFT TISSUE PRN
Status: DISCONTINUED | OUTPATIENT
Start: 2024-02-15 | End: 2024-02-15 | Stop reason: SURG

## 2024-02-15 RX ORDER — LIDOCAINE HYDROCHLORIDE 20 MG/ML
INJECTION, SOLUTION EPIDURAL; INFILTRATION; INTRACAUDAL; PERINEURAL PRN
Status: DISCONTINUED | OUTPATIENT
Start: 2024-02-15 | End: 2024-02-15 | Stop reason: SURG

## 2024-02-15 RX ORDER — DIPHENHYDRAMINE HYDROCHLORIDE 50 MG/ML
12.5 INJECTION INTRAMUSCULAR; INTRAVENOUS
Status: DISCONTINUED | OUTPATIENT
Start: 2024-02-15 | End: 2024-02-15 | Stop reason: HOSPADM

## 2024-02-15 RX ORDER — OXYCODONE HCL 5 MG/5 ML
5 SOLUTION, ORAL ORAL
Status: COMPLETED | OUTPATIENT
Start: 2024-02-15 | End: 2024-02-15

## 2024-02-15 RX ORDER — HEPARIN SODIUM,PORCINE 1000/ML
VIAL (ML) INJECTION PRN
Status: DISCONTINUED | OUTPATIENT
Start: 2024-02-15 | End: 2024-02-15 | Stop reason: SURG

## 2024-02-15 RX ORDER — METOPROLOL SUCCINATE 25 MG/1
25 TABLET, EXTENDED RELEASE ORAL DAILY
Status: DISCONTINUED | OUTPATIENT
Start: 2024-02-16 | End: 2024-02-16 | Stop reason: HOSPADM

## 2024-02-15 RX ORDER — HEPARIN SODIUM 1000 [USP'U]/ML
INJECTION, SOLUTION INTRAVENOUS; SUBCUTANEOUS
Status: COMPLETED
Start: 2024-02-15 | End: 2024-02-15

## 2024-02-15 RX ORDER — PROCHLORPERAZINE EDISYLATE 5 MG/ML
5-10 INJECTION INTRAMUSCULAR; INTRAVENOUS EVERY 4 HOURS PRN
Status: DISCONTINUED | OUTPATIENT
Start: 2024-02-15 | End: 2024-02-16

## 2024-02-15 RX ORDER — ACETAMINOPHEN 500 MG
1000 TABLET ORAL ONCE
Status: COMPLETED | OUTPATIENT
Start: 2024-02-15 | End: 2024-02-15

## 2024-02-15 RX ORDER — ASPIRIN 81 MG/1
81 TABLET, CHEWABLE ORAL DAILY
Status: DISCONTINUED | OUTPATIENT
Start: 2024-02-16 | End: 2024-02-16 | Stop reason: HOSPADM

## 2024-02-15 RX ORDER — CLOPIDOGREL BISULFATE 75 MG/1
75 TABLET ORAL DAILY
Status: DISCONTINUED | OUTPATIENT
Start: 2024-02-16 | End: 2024-02-16 | Stop reason: HOSPADM

## 2024-02-15 RX ORDER — LISINOPRIL 5 MG/1
5 TABLET ORAL DAILY
Status: DISCONTINUED | OUTPATIENT
Start: 2024-02-16 | End: 2024-02-16 | Stop reason: HOSPADM

## 2024-02-15 RX ORDER — LABETALOL HYDROCHLORIDE 5 MG/ML
5 INJECTION, SOLUTION INTRAVENOUS
Status: DISCONTINUED | OUTPATIENT
Start: 2024-02-15 | End: 2024-02-15 | Stop reason: HOSPADM

## 2024-02-15 RX ORDER — MEPERIDINE HYDROCHLORIDE 25 MG/ML
6.25 INJECTION INTRAMUSCULAR; INTRAVENOUS; SUBCUTANEOUS
Status: DISCONTINUED | OUTPATIENT
Start: 2024-02-15 | End: 2024-02-15 | Stop reason: HOSPADM

## 2024-02-15 RX ORDER — ONDANSETRON 2 MG/ML
INJECTION INTRAMUSCULAR; INTRAVENOUS PRN
Status: DISCONTINUED | OUTPATIENT
Start: 2024-02-15 | End: 2024-02-15 | Stop reason: SURG

## 2024-02-15 RX ORDER — AMOXICILLIN 250 MG
2 CAPSULE ORAL EVERY EVENING
Status: DISCONTINUED | OUTPATIENT
Start: 2024-02-15 | End: 2024-02-16 | Stop reason: HOSPADM

## 2024-02-15 RX ADMIN — IOHEXOL 125 ML: 300 INJECTION, SOLUTION INTRAVENOUS at 12:30

## 2024-02-15 RX ADMIN — ROCURONIUM BROMIDE 10 MG: 10 INJECTION, SOLUTION INTRAVENOUS at 11:21

## 2024-02-15 RX ADMIN — ACETAMINOPHEN 1000 MG: 500 TABLET ORAL at 15:15

## 2024-02-15 RX ADMIN — ACETAMINOPHEN 650 MG: 325 TABLET, FILM COATED ORAL at 20:15

## 2024-02-15 RX ADMIN — PROPOFOL 120 MG: 10 INJECTION, EMULSION INTRAVENOUS at 10:05

## 2024-02-15 RX ADMIN — MIDAZOLAM HYDROCHLORIDE 1 MG: 1 INJECTION, SOLUTION INTRAMUSCULAR; INTRAVENOUS at 12:17

## 2024-02-15 RX ADMIN — OXYCODONE HYDROCHLORIDE 5 MG: 5 SOLUTION ORAL at 13:15

## 2024-02-15 RX ADMIN — SODIUM CHLORIDE, POTASSIUM CHLORIDE, SODIUM LACTATE AND CALCIUM CHLORIDE: 600; 310; 30; 20 INJECTION, SOLUTION INTRAVENOUS at 09:40

## 2024-02-15 RX ADMIN — SUGAMMADEX 200 MG: 100 INJECTION, SOLUTION INTRAVENOUS at 12:02

## 2024-02-15 RX ADMIN — ONDANSETRON 4 MG: 2 INJECTION INTRAMUSCULAR; INTRAVENOUS at 13:46

## 2024-02-15 RX ADMIN — ONDANSETRON 4 MG: 2 INJECTION INTRAMUSCULAR; INTRAVENOUS at 11:33

## 2024-02-15 RX ADMIN — FENTANYL CITRATE 50 MCG: 50 INJECTION, SOLUTION INTRAMUSCULAR; INTRAVENOUS at 10:05

## 2024-02-15 RX ADMIN — HEPARIN SODIUM 5000 UNITS: 1000 INJECTION, SOLUTION INTRAVENOUS; SUBCUTANEOUS at 10:57

## 2024-02-15 RX ADMIN — PROPOFOL 50 MG: 10 INJECTION, EMULSION INTRAVENOUS at 11:21

## 2024-02-15 RX ADMIN — ROCURONIUM BROMIDE 30 MG: 10 INJECTION, SOLUTION INTRAVENOUS at 11:36

## 2024-02-15 RX ADMIN — EPHEDRINE SULFATE 10 MG: 50 INJECTION, SOLUTION INTRAVENOUS at 10:26

## 2024-02-15 RX ADMIN — ROCURONIUM BROMIDE 50 MG: 10 INJECTION, SOLUTION INTRAVENOUS at 10:05

## 2024-02-15 RX ADMIN — DEXAMETHASONE SODIUM PHOSPHATE 8 MG: 4 INJECTION INTRA-ARTICULAR; INTRALESIONAL; INTRAMUSCULAR; INTRAVENOUS; SOFT TISSUE at 11:34

## 2024-02-15 RX ADMIN — ATORVASTATIN CALCIUM 40 MG: 40 TABLET, FILM COATED ORAL at 15:15

## 2024-02-15 RX ADMIN — PROPOFOL 30 MG: 10 INJECTION, EMULSION INTRAVENOUS at 10:42

## 2024-02-15 RX ADMIN — MIDAZOLAM HYDROCHLORIDE 1 MG: 1 INJECTION, SOLUTION INTRAMUSCULAR; INTRAVENOUS at 10:00

## 2024-02-15 RX ADMIN — FENTANYL CITRATE 50 MCG: 50 INJECTION, SOLUTION INTRAMUSCULAR; INTRAVENOUS at 12:17

## 2024-02-15 RX ADMIN — EPHEDRINE SULFATE 20 MG: 50 INJECTION, SOLUTION INTRAVENOUS at 11:57

## 2024-02-15 RX ADMIN — LIDOCAINE HYDROCHLORIDE 60 MG: 20 INJECTION, SOLUTION EPIDURAL; INFILTRATION; INTRACAUDAL at 10:05

## 2024-02-15 RX ADMIN — ROCURONIUM BROMIDE 40 MG: 10 INJECTION, SOLUTION INTRAVENOUS at 10:44

## 2024-02-15 RX ADMIN — EPHEDRINE SULFATE 10 MG: 50 INJECTION, SOLUTION INTRAVENOUS at 10:51

## 2024-02-15 RX ADMIN — EPHEDRINE SULFATE 10 MG: 50 INJECTION, SOLUTION INTRAVENOUS at 11:30

## 2024-02-15 ASSESSMENT — LIFESTYLE VARIABLES
AVERAGE NUMBER OF DAYS PER WEEK YOU HAVE A DRINK CONTAINING ALCOHOL: 0
TOTAL SCORE: 0
TOTAL SCORE: 0
ALCOHOL_USE: NO
HAVE PEOPLE ANNOYED YOU BY CRITICIZING YOUR DRINKING: NO
EVER HAD A DRINK FIRST THING IN THE MORNING TO STEADY YOUR NERVES TO GET RID OF A HANGOVER: NO
HAVE YOU EVER FELT YOU SHOULD CUT DOWN ON YOUR DRINKING: NO
DOES PATIENT WANT TO STOP DRINKING: NO
ON A TYPICAL DAY WHEN YOU DRINK ALCOHOL HOW MANY DRINKS DO YOU HAVE: 0
TOTAL SCORE: 0
EVER FELT BAD OR GUILTY ABOUT YOUR DRINKING: NO
CONSUMPTION TOTAL: NEGATIVE
HOW MANY TIMES IN THE PAST YEAR HAVE YOU HAD 5 OR MORE DRINKS IN A DAY: 0

## 2024-02-15 ASSESSMENT — ENCOUNTER SYMPTOMS
BACK PAIN: 0
FEVER: 0
NAUSEA: 0
NECK PAIN: 0
MEMORY LOSS: 0
BLURRED VISION: 0
COUGH: 0
DOUBLE VISION: 0
SHORTNESS OF BREATH: 0
CHILLS: 0
VOMITING: 0
FLANK PAIN: 0
INSOMNIA: 0
PALPITATIONS: 0
HEADACHES: 1

## 2024-02-15 ASSESSMENT — PATIENT HEALTH QUESTIONNAIRE - PHQ9
2. FEELING DOWN, DEPRESSED, IRRITABLE, OR HOPELESS: NOT AT ALL
SUM OF ALL RESPONSES TO PHQ9 QUESTIONS 1 AND 2: 0
1. LITTLE INTEREST OR PLEASURE IN DOING THINGS: NOT AT ALL

## 2024-02-15 ASSESSMENT — COGNITIVE AND FUNCTIONAL STATUS - GENERAL
WALKING IN HOSPITAL ROOM: A LITTLE
MOBILITY SCORE: 21
HELP NEEDED FOR BATHING: A LITTLE
DAILY ACTIVITIY SCORE: 21
TOILETING: A LITTLE
STANDING UP FROM CHAIR USING ARMS: A LITTLE
CLIMB 3 TO 5 STEPS WITH RAILING: A LITTLE
SUGGESTED CMS G CODE MODIFIER MOBILITY: CJ
DRESSING REGULAR LOWER BODY CLOTHING: A LITTLE
SUGGESTED CMS G CODE MODIFIER DAILY ACTIVITY: CJ

## 2024-02-15 ASSESSMENT — PAIN DESCRIPTION - PAIN TYPE
TYPE: ACUTE PAIN

## 2024-02-15 ASSESSMENT — PAIN SCALES - GENERAL: PAIN_LEVEL: 2

## 2024-02-15 NOTE — PROGRESS NOTES
Report received from Elton Rangel.  Patient underwent an aneurysm repair by Dr. Lemons.  All vital signs monitoring and medication administration by anesthesia services - See anesthesia flowsheet for details. Report called to [facility]ACU RN. Pt transported by deepa with RN and anesthesia to [facility].        TERUMO  Angio-Seal VIP  Vascular Closure Device  [id number]  REF: [id number]  LOT: [id number]  EXP: 11/27/2024

## 2024-02-15 NOTE — ANESTHESIA POSTPROCEDURE EVALUATION
Patient: Dilia Johnston    Procedure Summary       Date: 02/15/24 Room / Location: [facility] - Inter[facility] - [facility]    Anesthesia Start: 0956 Anesthesia Stop: 1234    Procedure: IR-EMBOLIZE-NEURO-INTRACRANIAL Diagnosis:       Cerebral aneurysm, nonruptured      (Left ICA terminus aneurysm)      (coiling/web placement)    Scheduled Providers: Zana Lemons M.D.; Rita Jones M.D. Responsible Provider: Rita Jones M.D.    Anesthesia Type: general ASA Status: 2            Final Anesthesia Type: general  Last vitals  BP   Blood Pressure: 114/68, Arterial BP: 130/60    Temp   36 °C (96.8 °F)    Pulse   80   Resp   18    SpO2   100 %      Anesthesia Post Evaluation    Patient location during evaluation: PACU  Patient participation: complete - patient participated  Level of consciousness: awake and alert  Pain score: 2    Airway patency: patent  Anesthetic complications: no  Cardiovascular status: adequate  Respiratory status: acceptable  Hydration status: acceptable    PONV: none          No notable events documented.     Nurse Pain Score: 0 (NPRS)

## 2024-02-15 NOTE — PROGRESS NOTES
Med Rec complete per patient and spouse at bedside with RX bottles   Allergies reviewed  Antibiotics in the past 30 days:no  Anticoagulant in past 14 days:no  Pharmacy patient utilizes:[facility]

## 2024-02-15 NOTE — PROGRESS NOTES
Pt arrives to R102 from PACU. PACU RN holding manual pressure to R. Groin site. Slow oozing, sandbag applied.   Skin assessment deferred at this time to achieve hemostasis.

## 2024-02-15 NOTE — ASSESSMENT & PLAN NOTE
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

## 2024-02-15 NOTE — H&P
History and Physical    Date: 2/15/2024    PCP: Mat Houston M.D.          HPI: This is a 59 y.o. female who is presenting with left ICA aneurysm     Past Medical History:   Diagnosis Date    High cholesterol     Hypertension     Myocardial infarct (HCC) 2021    Seizure (HCC) 01/22/2024    During completion of stress test       Past Surgical History:   Procedure Laterality Date    HYSTEROSCOPY WITH VIDEO DIAGNOSTIC  3/5/2009    Performed by DANIELE PERDOMO at SURGERY SAME DAY [facility] ORS    DILATION AND CURETTAGE  3/5/2009    Performed by DANIELE PERDOMO at SURGERY SAME DAY [facility] ORS       Current Facility-Administered Medications   Medication Dose Route Frequency Provider Last Rate Last Admin    lidocaine (Xylocaine) 1 % injection 0.5 mL  0.5 mL Intradermal Once PRN Zana Lemons M.D.        lactated ringers infusion   Intravenous Continuous Zana Lemons M.D. 10 mL/hr at 02/15/24 0940 New Bag at 02/15/24 0940    FENTANYL CITRATE (PF) 0.05 MG/ML INJ SOLN (WRAPPED)             MIDAZOLAM HCL 1 MG/ML INJ SOLN (WRAPPER)             SUGAMMADEX SODIUM 200 MG/2ML IV SOLN                 Social History     Socioeconomic History    Marital status:      Spouse name: Not on file    Number of children: Not on file    Years of education: Not on file    Highest education level: Not on file   Occupational History    Not on file   Tobacco Use    Smoking status: Never    Smokeless tobacco: Never   Vaping Use    Vaping Use: Never used   Substance and Sexual Activity    Alcohol use: Never    Drug use: Never    Sexual activity: Not on file   Other Topics Concern    Not on file   Social History Narrative    ** Merged History Encounter **          Social Determinants of Health     Financial Resource Strain: Not on file   Food Insecurity: Not on file   Transportation Needs: Not on file   Physical Activity: Not on file   Stress: Not on file   Social Connections: Not on file   Intimate Partner Violence:  Not on file   Housing Stability: Not on file       History reviewed. No pertinent family history.    Allergies:  Patient has no known allergies.    Review of Systems:  Left ICA aneurym    Physical Exam  Constitutional:       General: She is not in acute distress.     Appearance: She is well-developed. She is not diaphoretic.   Eyes:      General: No scleral icterus.  Pulmonary:      Effort: Pulmonary effort is normal. No respiratory distress.   Abdominal:      General: There is no distension.      Palpations: Abdomen is soft.   Skin:     General: Skin is warm and dry.      Coloration: Skin is not pale.      Findings: No erythema or rash.   Neurological:      Mental Status: She is alert and oriented to person, place, and time.      Cranial Nerves: No cranial nerve deficit.      Coordination: Coordination normal.   Psychiatric:         Behavior: Behavior normal.         Thought Content: Thought content normal.         Judgment: Judgment normal.         Vital Signs  Blood Pressure: 119/69   Temperature: 36.3 °C (97.3 °F)   Pulse: 74   Respiration: 20   Pulse Oximetry: 96 %        Labs:                    Radiology:  IR-EMBOLIZE-NEURO-INTRACRANIAL    (Results Pending)             Assessment and Plan:This is a 59 y.o. with left ICA aneurysm    Plan:Endovascular repair                    fair, will monitor progress closely

## 2024-02-15 NOTE — ANESTHESIA TIME REPORT
Anesthesia Start and Stop Event Times       Date Time Event    2/15/2024 0942 Ready for Procedure     0956 Anesthesia Start     1234 Anesthesia Stop          Responsible Staff  02/15/24      Name Role Begin End    Rita Jones M.D. Anesth 0956 1234          Overtime Reason:  no overtime (within assigned shift)    Comments:

## 2024-02-15 NOTE — ANESTHESIA PROCEDURE NOTES
Arterial Line    Performed by: Rita Jones M.D.  Authorized by: Rita Jones M.D.    Start Time:  2/15/2024 10:34 AM  End Time:  2/15/2024 10:44 AM  Localization: ultrasound guidance  Image captured, interpreted and electronically stored.  Patient Location:  OR  Indication: continuous blood pressure monitoring and blood sampling needed        Catheter Size:  20 G  Seldinger Technique?: Yes    Laterality:  Left  Site:  Radial artery  Line Secured:  Transparent dressing and antimicrobial disc  Events: patient tolerated procedure well with no complications and greater than 3 attempts

## 2024-02-15 NOTE — ANESTHESIA PREPROCEDURE EVALUATION
Date/Time: 02/15/24 1000    Scheduled providers: Zana Lemons M.D.; Rita Jones M.D.    Procedure: IR-EMBOLIZE-NEURO-INTRACRANIAL    Diagnosis: Cerebral aneurysm, nonruptured [I67.1]    Indications:       Left ICA terminus aneurysm      coiling/web placement    Location: [facility]            Relevant Problems   No relevant active problems       Physical Exam    Airway   Mallampati: III  TM distance: >3 FB  Neck ROM: full       Cardiovascular   Rhythm: regular  Rate: normal     Dental - normal exam           Pulmonary   Breath sounds clear to auscultation     Abdominal - normal exam     Neurological                Anesthesia Plan    ASA 2       Plan - general       Airway plan will be ETT          Induction: intravenous          Informed Consent:    Anesthetic plan and risks discussed with patient.    Use of blood products discussed with: whom consented to blood products.

## 2024-02-15 NOTE — OR SURGEON
Immediate Post- Operative Note        Findings: left ICA aneurysm      Procedure(s): stent assisted coiling        Estimated Blood Loss: Less than 5 ml        Complications: None            2/15/2024     12:03 PM     Zana Lemons M.D.

## 2024-02-15 NOTE — CONSULTS
"Critical Care Consultation    Date of consult: 2/15/2024    Referring Physician  Zana Lemons M.D.    Reason for Consultation  ICU admission s/p IR procedure.    History of Presenting Illness  Dilia Johnston is a 59-year-old with PMH significant for HTN and HLD who presented today for elective left ICA aneurysm stenting.  Reportedly patient was having a cardiac stress test at outside facility (outside records not available) when she had seizure-like activity.  They got a head CT at that time which found incidental left ICA aneurysm.  She was started on DAPT 5 days ago in anticipation of elective stent placement today.  Procedure was uncomplicated.  She is being admitted to the ICU postprocedure for serial neurologic exams and close hemodynamic monitoring.    On assessment, she has no focal neurodeficits.  She is hemodynamically stable. Her groin site had some oozing initially, but stopped after placement of sandbag. There is no evidence of hematoma formation.    Code Status  Full Code    Review of Systems  Review of Systems   Constitutional:  Negative for chills and fever.   HENT: Negative.     Eyes:  Negative for blurred vision and double vision.   Respiratory:  Negative for cough and shortness of breath.    Cardiovascular:  Negative for chest pain and palpitations.   Gastrointestinal:  Negative for nausea and vomiting.   Genitourinary:  Negative for flank pain and hematuria.   Musculoskeletal:  Negative for back pain and neck pain.   Neurological:  Positive for headaches (\"just a little\").   Psychiatric/Behavioral:  Negative for memory loss. The patient does not have insomnia.    All other systems reviewed and are negative.      Past Medical History   has a past medical history of High cholesterol, Hypertension, Myocardial infarct (HCC) (2021), and Seizure (HCC) (01/22/2024).    She has no past medical history of Hemorrhagic disorder (HCC).    Surgical History   has a past surgical history that includes " hysteroscopy with video diagnostic (3/5/2009) and dilation and curettage (3/5/2009).    Family History  family history is not on file.    Social History   reports that she has never smoked. She has never used smokeless tobacco. She reports that she does not drink alcohol and does not use drugs.    Medications  Home Medications       Reviewed by Emily Camacho R.N. (Registered Nurse) on 02/15/24 at 0919  Med List Status: Complete     Medication Last Dose Status   acetaminophen (TYLENOL) 325 MG Tab 2/14/2024 Active   amitriptyline (ELAVIL) 50 MG Tab 2/14/2024 Active   aspirin (ASPIRIN 81) 81 MG EC tablet 2/14/2024 Active   atorvastatin (LIPITOR) 40 MG Tab 2/14/2024 Active   clopidogrel (PLAVIX) 75 MG Tab 2/14/2024 Active   lisinopril (PRINIVIL) 5 MG Tab 2/14/2024 Active   metoprolol SR (TOPROL XL) 25 MG TABLET SR 24 HR 2/14/2024 Active                  Current Facility-Administered Medications   Medication Dose Route Frequency Provider Last Rate Last Admin    lidocaine (Xylocaine) 1 % injection 0.5 mL  0.5 mL Intradermal Once PRN Zana Lemons M.D.        [START ON 2/16/2024] aspirin (Asa) chewable tab 81 mg  81 mg Oral DAILY Zana Lemons M.D.        [START ON 2/16/2024] clopidogrel (Plavix) tablet 75 mg  75 mg Oral DAILY Zana Lemons M.D.        acetaminophen (Tylenol) tablet 650 mg  650 mg Oral Q6HRS PRN Janina Egan, A.P.R.N.        senna-docusate (Pericolace Or Senokot S) 8.6-50 MG per tablet 2 Tablet  2 Tablet Oral Q EVENING Janina Egan, A.P.R.N.        And    polyethylene glycol/lytes (Miralax) Packet 1 Packet  1 Packet Oral QDAY PRN Janina Canoer, A.P.R.N.        hydrALAZINE (Apresoline) injection 10 mg  10 mg Intravenous Q4HRS PRN Janina Egan, A.P.R.N.        ondansetron (Zofran) syringe/vial injection 4 mg  4 mg Intravenous Q4HRS PRN JULIO Madrigal.P.R.N.        ondansetron (Zofran ODT) dispertab 4 mg  4 mg Oral Q4HRS PRN Janina Egan, A.P.R.N.        promethazine (Phenergan)  tablet 12.5-25 mg  12.5-25 mg Oral Q4HRS PRN Janina Egan, A.P.R.N.        promethazine (Phenergan) suppository 12.5-25 mg  12.5-25 mg Rectal Q4HRS PRN Janina Egan, A.P.R.N.        prochlorperazine (Compazine) injection 5-10 mg  5-10 mg Intravenous Q4HRS PRN Janina Egan, A.P.R.N.        atorvastatin (Lipitor) tablet 40 mg  40 mg Oral DAILY Janina Egan, A.P.R.N.   40 mg at 02/15/24 1515    [START ON 2/16/2024] lisinopril (Prinivil) tablet 5 mg  5 mg Oral DAILY Janina Egan, A.P.R.N.        [START ON 2/16/2024] metoprolol SR (Toprol XL) tablet 25 mg  25 mg Oral DAILY Janina Egan, A.P.R.N.           Allergies  No Known Allergies    Vital Signs last 24 hours  Temp:  [35.8 °C (96.5 °F)-36.3 °C (97.3 °F)] 36 °C (96.8 °F)  Pulse:  [72-81] 81  Resp:  [13-20] 20  BP: (101-124)/(51-72) 114/65  SpO2:  [96 %-100 %] 100 %    Physical Exam  Physical Exam  Vitals and nursing note reviewed.   Constitutional:       General: She is not in acute distress.     Appearance: Normal appearance. She is overweight. She is ill-appearing. She is not toxic-appearing.      Interventions: Nasal cannula in place.   HENT:      Head: Normocephalic.      Right Ear: Hearing normal.      Left Ear: Hearing normal.      Nose: Nose normal.      Mouth/Throat:      Lips: Pink.      Mouth: Mucous membranes are moist.   Eyes:      Pupils: Pupils are equal, round, and reactive to light.   Cardiovascular:      Rate and Rhythm: Normal rate and regular rhythm.      Pulses: Normal pulses.      Heart sounds: Normal heart sounds.      Comments: Right groin site without hematoma  Left radial art line  Pulmonary:      Effort: Pulmonary effort is normal.      Breath sounds: Normal breath sounds. No wheezing.   Abdominal:      General: Bowel sounds are normal.      Palpations: Abdomen is soft.      Tenderness: There is no abdominal tenderness. There is no guarding or rebound.   Musculoskeletal:      Right lower leg: No edema.      Left lower leg: No  edema.      Comments: BARLOW 5/5   Skin:     General: Skin is warm and dry.      Capillary Refill: Capillary refill takes less than 2 seconds.   Neurological:      Mental Status: She is alert and oriented to person, place, and time.      GCS: GCS eye subscore is 4. GCS verbal subscore is 5. GCS motor subscore is 6.      Cranial Nerves: Cranial nerves 2-12 are intact.      Sensory: Sensation is intact.      Motor: Motor function is intact.      Coordination: Coordination is intact.   Psychiatric:         Mood and Affect: Mood normal.         Speech: Speech normal.         Behavior: Behavior is cooperative.         Cognition and Memory: Cognition normal.         Judgment: Judgment normal.         Fluids    Intake/Output Summary (Last 24 hours) at 2/15/2024 1521  Last data filed at 2/15/2024 1234  Gross per 24 hour   Intake 1400 ml   Output 50 ml   Net 1350 ml       Laboratory  Recent Results (from the past 48 hour(s))   EKG    Collection Time: 02/15/24  9:23 AM   Result Value Ref Range    Report       [facility]    Test Date:  2024-02-15  Pt Name:    CHANTELL HAWK                Department: [facility]  MRN:        [id number]                      Room:       [facility]  Gender:     Female                       Technician: [facility]  :        1965                   Requested By:JENN MATTHEWS  Order #:    [id number]                    Reading MD:    Measurements  Intervals                                Axis  Rate:       71                           P:          42  CT:         175                          QRS:        138  QRSD:       139                          T:          5  QT:         462  QTc:        503    Interpretive Statements  Sinus rhythm  RBBB and LPFB  No previous ECG available for comparison     PLATELET MAPPING WITH BASIC TEG    Collection Time: 02/15/24  9:39 AM   Result Value Ref Range    Reaction Time Initial-R 5.8 4.6 - 9.1 min    React Time Initial Hep 6.4 4.3 - 8.3 min    Clot Kinetics-K 1.6 0.8 - 2.1  min    Clot Angle-Angle 68.3 63.0 - 78.0 degrees    Maximum Clot Strength-MA 57.9 52.0 - 69.0 mm    TEG Functional Fibrinogen(MA) 19.0 15.0 - 32.0 mm    Lysis 30 minutes-LY30 1.4 0.0 - 2.6 %    % Inhibition ADP 69.4 (H) 0.0 - 17.0 %    % Inhibition AA 94.2 (H) 0.0 - 11.0 %    TEG Algorithm Link Algorithm        Imaging  IR-EMBOLIZE-NEURO-INTRACRANIAL    (Results Pending)       Assessment/Plan  Cerebral aneurysm- (present on admission)  Assessment & Plan  -Reportedly patient had seizure-like activity during a stress test at an outside facility and imaging at that time 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 Wray .    The patient remains critically ill.  Critical care time = 39 minutes in directly providing and coordinating critical care and extensive data review.  No time overlap and excludes procedures.    Please note that this dictation was created using voice recognition software. I have made every reasonable attempt to correct obvious errors, but there may be errors of grammar and possibly content that I did not discover before finalizing the note.    EDWIN Madrigal.

## 2024-02-15 NOTE — ANESTHESIA PROCEDURE NOTES
Airway    Date/Time: 2/15/2024 10:12 AM    Performed by: Rita Jones M.D.  Authorized by: Rita Jones M.D.    Location:  OR  Urgency:  Elective  Difficult Airway: No    Indications for Airway Management:  Anesthesia      Spontaneous Ventilation: absent    Sedation Level:  Deep  Preoxygenated: Yes    Patient Position:  Sniffing  MILS Maintained Throughout: No    Mask Difficulty Assessment:  1 - vent by mask  Final Airway Type:  Endotracheal airway  Final Endotracheal Airway:  ETT  Cuffed: Yes    Technique Used for Successful ETT Placement:  Direct laryngoscopy  Devices/Methods Used in Placement:  Intubating stylet    Insertion Site:  Oral  Blade Type:  [place]  Laryngoscope Blade/Videolaryngoscope Blade Size:  2  ETT Size (mm):  6.5  Placement Verified by: capnometry    Cormack-Lehane Classification:  Grade I - full view of glottis  Number of Attempts at Approach:  1

## 2024-02-15 NOTE — CARE PLAN
The patient is Watcher - Medium risk of patient condition declining or worsening         Progress made toward(s) clinical / shift goals:      Problem: Knowledge Deficit - Standard  Goal: Patient and family/care givers will demonstrate understanding of plan of care, disease process/condition, diagnostic tests and medications  Outcome: Progressing     Problem: Skin Integrity  Goal: Skin integrity is maintained or improved  Outcome: Progressing     Problem: Pain - Standard  Goal: Alleviation of pain or a reduction in pain to the patient’s comfort goal  Outcome: Progressing       Patient is not progressing towards the following goals: N/A

## 2024-02-15 NOTE — PROGRESS NOTES
Pt presents to [facility]. PT was consented by MD at bedside, confirmed by this RN and consent at bedside. Anesthesia consent confirmed and at bedside. Anesthesia care provided by Dr. Jones. Pt transferred to IR2 table in Supine position. Patient underwent a Cerebral Angiogram with Aneurysm Coiling by Dr. Lemons. Procedure site was marked by MD and verified using imaging guidance. Pt placed on monitor, prepped and draped in a sterile fashion. All vital signs monitoring and medication administration by anesthesia services - See anesthesia flowsheet for details. Report given to Mihir MILLER RN.      Penumbra Coil 400 Complex Extra Soft 4mm x 10cm  REF: [id number]  LOT: [id number]  EXP: 2028-11-14     Penumbra Coil 400 Complex Extra Soft 2mm x 4cm  REF: [id number]  LOT: [id number]  EXP: 2028-12-14    Terumo Microvention 3.5mm x 18mm  REF:  [id number]  LOT:  [id number]  EXP:  2026-01-31

## 2024-02-15 NOTE — OR NURSING
Pt complaining of headache. Pt's right groin site oozing. Manual pressure applied. MD Lemons notified and aware. No new orders at this time. Will continue to monitor.

## 2024-02-15 NOTE — OR NURSING
Pt's VSS. Pt A&Ox4. Pt resting comfortably. Pt states she has headache. Medication given. See MAR. Pt's right groin site CDI, soft. Pt's right groined dressed with gauze and tegaderm. Pt's , Shamar, called and updated. Pt transferring to R102.

## 2024-02-16 ENCOUNTER — APPOINTMENT (OUTPATIENT)
Dept: RADIOLOGY | Facility: MEDICAL CENTER | Age: 59
DRG: 027 | End: 2024-02-16
Attending: EMERGENCY MEDICINE
Payer: COMMERCIAL

## 2024-02-16 VITALS
BODY MASS INDEX: 29.26 KG/M2 | WEIGHT: 165.12 LBS | TEMPERATURE: 98 F | SYSTOLIC BLOOD PRESSURE: 129 MMHG | RESPIRATION RATE: 35 BRPM | DIASTOLIC BLOOD PRESSURE: 60 MMHG | OXYGEN SATURATION: 94 % | HEIGHT: 63 IN | HEART RATE: 89 BPM

## 2024-02-16 DIAGNOSIS — I67.1 CEREBRAL ANEURYSM: ICD-10-CM

## 2024-02-16 PROBLEM — R51.9 HEADACHE: Status: ACTIVE | Noted: 2024-02-16

## 2024-02-16 LAB
ANION GAP SERPL CALC-SCNC: 11 MMOL/L (ref 7–16)
BUN SERPL-MCNC: 12 MG/DL (ref 8–22)
CALCIUM SERPL-MCNC: 8.6 MG/DL (ref 8.5–10.5)
CHLORIDE SERPL-SCNC: 104 MMOL/L (ref 96–112)
CO2 SERPL-SCNC: 22 MMOL/L (ref 20–33)
CREAT SERPL-MCNC: 0.54 MG/DL (ref 0.5–1.4)
ERYTHROCYTE [DISTWIDTH] IN BLOOD BY AUTOMATED COUNT: 45.1 FL (ref 35.9–50)
GFR SERPLBLD CREATININE-BSD FMLA CKD-EPI: 107 ML/MIN/1.73 M 2
GLUCOSE SERPL-MCNC: 135 MG/DL (ref 65–99)
HCT VFR BLD AUTO: 39 % (ref 37–47)
HGB BLD-MCNC: 13.2 G/DL (ref 12–16)
MAGNESIUM SERPL-MCNC: 2.2 MG/DL (ref 1.5–2.5)
MCH RBC QN AUTO: 31.7 PG (ref 27–33)
MCHC RBC AUTO-ENTMCNC: 33.8 G/DL (ref 32.2–35.5)
MCV RBC AUTO: 93.8 FL (ref 81.4–97.8)
PHOSPHATE SERPL-MCNC: 4 MG/DL (ref 2.5–4.5)
PLATELET # BLD AUTO: 177 K/UL (ref 164–446)
PMV BLD AUTO: 11.7 FL (ref 9–12.9)
POTASSIUM SERPL-SCNC: 4.1 MMOL/L (ref 3.6–5.5)
RBC # BLD AUTO: 4.16 M/UL (ref 4.2–5.4)
SODIUM SERPL-SCNC: 137 MMOL/L (ref 135–145)
WBC # BLD AUTO: 11.9 K/UL (ref 4.8–10.8)

## 2024-02-16 PROCEDURE — A9270 NON-COVERED ITEM OR SERVICE: HCPCS | Performed by: RADIOLOGY

## 2024-02-16 PROCEDURE — A9270 NON-COVERED ITEM OR SERVICE: HCPCS | Performed by: NURSE PRACTITIONER

## 2024-02-16 PROCEDURE — 70450 CT HEAD/BRAIN W/O DYE: CPT

## 2024-02-16 PROCEDURE — 99232 SBSQ HOSP IP/OBS MODERATE 35: CPT | Performed by: EMERGENCY MEDICINE

## 2024-02-16 PROCEDURE — 700111 HCHG RX REV CODE 636 W/ 250 OVERRIDE (IP): Performed by: EMERGENCY MEDICINE

## 2024-02-16 PROCEDURE — 84100 ASSAY OF PHOSPHORUS: CPT

## 2024-02-16 PROCEDURE — 700111 HCHG RX REV CODE 636 W/ 250 OVERRIDE (IP): Performed by: NURSE PRACTITIONER

## 2024-02-16 PROCEDURE — 99239 HOSP IP/OBS DSCHRG MGMT >30: CPT | Performed by: HOSPITALIST

## 2024-02-16 PROCEDURE — 80048 BASIC METABOLIC PNL TOTAL CA: CPT

## 2024-02-16 PROCEDURE — 85027 COMPLETE CBC AUTOMATED: CPT

## 2024-02-16 PROCEDURE — 700102 HCHG RX REV CODE 250 W/ 637 OVERRIDE(OP): Performed by: NURSE PRACTITIONER

## 2024-02-16 PROCEDURE — 83735 ASSAY OF MAGNESIUM: CPT

## 2024-02-16 PROCEDURE — 700102 HCHG RX REV CODE 250 W/ 637 OVERRIDE(OP): Performed by: RADIOLOGY

## 2024-02-16 RX ORDER — PROCHLORPERAZINE EDISYLATE 5 MG/ML
10 INJECTION INTRAMUSCULAR; INTRAVENOUS EVERY 6 HOURS PRN
Status: DISCONTINUED | OUTPATIENT
Start: 2024-02-16 | End: 2024-02-16 | Stop reason: HOSPADM

## 2024-02-16 RX ORDER — DIPHENHYDRAMINE HYDROCHLORIDE 50 MG/ML
12.5 INJECTION INTRAMUSCULAR; INTRAVENOUS ONCE
Status: COMPLETED | OUTPATIENT
Start: 2024-02-16 | End: 2024-02-16

## 2024-02-16 RX ADMIN — ACETAMINOPHEN 650 MG: 325 TABLET, FILM COATED ORAL at 02:48

## 2024-02-16 RX ADMIN — ASPIRIN 81 MG: 81 TABLET, CHEWABLE ORAL at 08:44

## 2024-02-16 RX ADMIN — CLOPIDOGREL BISULFATE 75 MG: 75 TABLET ORAL at 08:44

## 2024-02-16 RX ADMIN — DIPHENHYDRAMINE HYDROCHLORIDE 12.5 MG: 50 INJECTION, SOLUTION INTRAMUSCULAR; INTRAVENOUS at 07:51

## 2024-02-16 RX ADMIN — PROCHLORPERAZINE EDISYLATE 10 MG: 5 INJECTION INTRAMUSCULAR; INTRAVENOUS at 07:47

## 2024-02-16 RX ADMIN — ATORVASTATIN CALCIUM 40 MG: 40 TABLET, FILM COATED ORAL at 05:27

## 2024-02-16 RX ADMIN — ACETAMINOPHEN 650 MG: 325 TABLET, FILM COATED ORAL at 07:50

## 2024-02-16 ASSESSMENT — PAIN DESCRIPTION - PAIN TYPE
TYPE: ACUTE PAIN

## 2024-02-16 ASSESSMENT — ENCOUNTER SYMPTOMS
SPEECH CHANGE: 0
FEVER: 0
SENSORY CHANGE: 0
NAUSEA: 0
VOMITING: 0
SHORTNESS OF BREATH: 0
CHILLS: 0
HEADACHES: 0
WEAKNESS: 0
PALPITATIONS: 0
TINGLING: 0

## 2024-02-16 NOTE — PROGRESS NOTES
Radiology Progress Note   Author: ROSA Amin Date & Time created: 2/16/2024  8:11 AM   Date of admission  2/15/2024  Note to reader: this note follows the APSO format rather than the historical SOAP format. Assessment and plan located at the top of the note for ease of use.    Chief Complaint  59 y.o. female admitted 2/15/2024 with left ICA aneurysm      HPI  59-year-old female with PMH significant for HTN , HLD, and left ICA terminus aneurysm who presented for elective left ICA aneurysm stent assisted coiling with IR Dr. Lemons on 02/15/24. Intraoperative course was uneventful. She was admitted to the ICU postprocedure for serial neurologic exams and close hemodynamic monitoring.       Interval History:   02/16/24 -patient reported headache this morning.  CT head ordered by critical care team with no acute findings.  Headache responded to Benadryl,  Compazine, and Tylenol.  Patient discussed with intensivist.    Assessment/Plan     Active Problems:    HTN (hypertension)    HLD (hyperlipidemia)    Cerebral aneurysm      Plan IR  - POD#1 successful left ICA stent assisted coiling  - Post-angioseal instructions: no lifting greater than 5 lbs and no baths/swimming/soaking in tub for 5 days. Shower OK. OK to change dressings/band aid as needed.  - Continue Plavix 75 mg and aspirin 81 mg for 3 months S/P procedure  - Follow up appointment in 2-4 weeks with OP Neuro IR, our office to call and schedule  - Neuro Checks per ICU policy  - From a Neuro Interventional Service standpoint, OK to discharge when medically cleared.    -  -Thank you for allowing Interventional Radiology team to participate in the patients care, if any additional care or requests are needed in the future please do not hesitate to call or place IR order   [phone]           Review of Systems  Physical Exam   Review of Systems   Constitutional:  Negative for chills and fever.   Respiratory:  Negative for shortness of breath.   "  Cardiovascular:  Negative for chest pain and palpitations.   Gastrointestinal:  Negative for nausea and vomiting.   Neurological:  Negative for tingling, sensory change, speech change, weakness and headaches.      Vitals:    02/16/24 0700   BP: 114/64   Pulse: 81   Resp: 14   Temp:    SpO2: 97%        Physical Exam  Constitutional:       Appearance: Normal appearance.   Cardiovascular:      Rate and Rhythm: Normal rate and regular rhythm.   Abdominal:      Palpations: Abdomen is soft.   Skin:     General: Skin is warm and dry.      Comments: Right groin access site soft, non-tender, without ecchymosis; dressing cdi.      Neurological:      General: No focal deficit present.      Mental Status: She is alert and oriented to person, place, and time.      Cranial Nerves: No cranial nerve deficit.      Sensory: No sensory deficit.      Motor: No weakness.   Psychiatric:         Mood and Affect: Mood normal.         Behavior: Behavior normal.             Labs    Recent Labs     02/16/24  0540   WBC 11.9*   RBC 4.16*   HEMOGLOBIN 13.2   HEMATOCRIT 39.0   MCV 93.8   MCH 31.7   MCHC 33.8   RDW 45.1   PLATELETCT 177   MPV 11.7     Recent Labs     02/16/24  0540   SODIUM 137   POTASSIUM 4.1   CHLORIDE 104   CO2 22   GLUCOSE 135*   BUN 12   CREATININE 0.54   CALCIUM 8.6     Recent Labs     02/16/24  0540   CREATININE 0.54     IR-EMBOLIZE-NEURO-INTRACRANIAL    (Results Pending)   CT-HEAD W/O    (Results Pending)     INR   Date Value Ref Range Status   02/07/2024 0.99 0.87 - 1.13 Final     Comment:     INR - Non-therapeutic Reference Range: 0.87-1.13  INR - Therapeutic Reference Range: 2.0-4.0       No results found for: \"POCINR\"     Intake/Output Summary (Last 24 hours) at 2/16/2024 0811  Last data filed at 2/16/2024 0600  Gross per 24 hour   Intake 1900 ml   Output 675 ml   Net 1225 ml      Labs not explicitly included in this progress note were reviewed by the author. Radiology/imaging not explicitly included in this " progress note was reviewed by the author.     I have performed a physical exam and reviewed and updated ROS and Plan today (2/16/2024).     45 minutes in directly providing and coordinating care and extensive data review.  No time overlap and excludes procedures.

## 2024-02-16 NOTE — PROGRESS NOTES
Patient discharging home with , educated on medications and discharge instructions, both patient and  verbalize understanding. Ivs removed and patient dressed.

## 2024-02-16 NOTE — PROGRESS NOTES
4 Eyes Skin Assessment Completed by ROSALIA Tracy and ROSALIA Ortega.    Head WDL  Ears WDL  Nose WDL  Mouth WDL  Neck WDL  Breast/Chest WDL  Shoulder Blades Redness and Blanching, scars on bilateral shoulders  Spine WDL  (R) Arm/Elbow/Hand Bruising and Scar  (L) Arm/Elbow/Hand Scar  Abdomen WDL  Groin Incision, Right femoral access site  Scrotum/Coccyx/Buttocks WDL  (R) Leg WDL  (L) Leg WDL  (R) Heel/Foot/Toe Redness and Blanching  (L) Heel/Foot/Toe Redness and Blanching          Devices In Places ECG, Blood Pressure Cuff, Pulse Ox, SCD's, and Nasal Cannula      Interventions In Place NC W/Ear Foams, Pillows, Q2 Turns, Low Air Loss Mattress, and Heels Loaded W/Pillows    Possible Skin Injury No    Pictures Uploaded Into Epic N/A  Wound Consult Placed N/A  RN Wound Prevention Protocol Ordered No

## 2024-02-16 NOTE — PROGRESS NOTES
Critical Care Progress Note    Date of admission  2/15/2024    Chief Complaint  Dilia Johnston is a 59-year-old with PMH significant for HTN and HLD who presented today for elective left ICA aneurysm stenting.  Reportedly patient was having a cardiac stress test at outside facility (outside records not available) when she had seizure-like activity.  They got a head CT at that time which found incidental left ICA aneurysm.  She was started on DAPT 5 days ago in anticipation of elective stent placement today.  Procedure was uncomplicated.  She is being admitted to the ICU postprocedure for serial neurologic exams and close hemodynamic monitorin     Hospital Course  2/14-SAHNNON for L ICA aneurysm stent asssited coiling  2/15-Well appearing, ok to leave ICU     Interval Problem Update  Reviewed last 24 hour events:  Bad HA overnight associated with N and ocular pain and photophobia    She tells me this is very similar to her chronic HA  Has a chronic recurring HAs, routinely and frequently for many years, has been told she has migraines and takes TCA for this.  She usually takes APAP for abortive tx.    Neuro:     CV:  HR   -110    Resp:   3L NC, 90-96%  CXR       I/O: +1L  UOP: 625    Tmax: AF  Heme: WBC 11.9    Abx:   AF  Micro:   NI  Endo: BG WTR    LDA: PIVs, Renettamarci ramesh  SUP: NI  VTE: DAPT  Diet: REg        Review of Systems  Review of Systems   All other systems reviewed and are negative.       Vital Signs for last 24 hours   Temp:  [36.1 °C (96.9 °F)-36.7 °C (98 °F)] 36.7 °C (98 °F)  Pulse:  [] 89  Resp:  [14-35] 35  BP: (106-129)/(53-64) 129/60  SpO2:  [93 %-98 %] 94 %    Hemodynamic parameters for last 24 hours       Respiratory Information for the last 24 hours       Physical Exam   Physical Exam  Vitals and nursing note reviewed.   Constitutional:       General: She is not in acute distress.     Appearance: Normal appearance. She is overweight. She is not ill-appearing or toxic-appearing.       Interventions: Nasal cannula in place.   HENT:      Head: Normocephalic.      Right Ear: Hearing normal.      Left Ear: Hearing normal.      Nose: Nose normal.      Mouth/Throat:      Lips: Pink.      Mouth: Mucous membranes are moist.   Eyes:      Pupils: Pupils are equal, round, and reactive to light.   Cardiovascular:      Rate and Rhythm: Normal rate and regular rhythm.      Pulses: Normal pulses.      Heart sounds: Normal heart sounds.      Comments: Right groin site without hematoma  Left radial art line  Pulmonary:      Effort: Pulmonary effort is normal.      Breath sounds: Normal breath sounds. No wheezing.   Abdominal:      General: Bowel sounds are normal.      Palpations: Abdomen is soft.      Tenderness: There is no abdominal tenderness. There is no guarding or rebound.   Musculoskeletal:      Right lower leg: No edema.      Left lower leg: No edema.      Comments: BARLOW 5/5   Skin:     General: Skin is warm and dry.      Capillary Refill: Capillary refill takes less than 2 seconds.   Neurological:      General: No focal deficit present.      Mental Status: She is alert and oriented to person, place, and time. Mental status is at baseline.      GCS: GCS eye subscore is 4. GCS verbal subscore is 5. GCS motor subscore is 6.      Cranial Nerves: Cranial nerves 2-12 are intact.      Sensory: Sensation is intact.      Motor: Motor function is intact.      Coordination: Coordination is intact.   Psychiatric:         Mood and Affect: Mood normal.         Speech: Speech normal.         Behavior: Behavior is cooperative.         Cognition and Memory: Cognition normal.         Judgment: Judgment normal.         Medications  No current facility-administered medications for this encounter.     Current Outpatient Medications   Medication Sig Dispense Refill    acetaminophen (TYLENOL) 325 MG Tab Take 650 mg by mouth one time as needed for Mild Pain. 650 mg = 2 tabs      metoprolol SR (TOPROL XL) 25 MG TABLET SR 24 HR  Take 25 mg by mouth every day.      lisinopril (PRINIVIL) 5 MG Tab Take 1 Tablet by mouth every day.      atorvastatin (LIPITOR) 40 MG Tab Take 1 Tablet by mouth every day.      clopidogrel (PLAVIX) 75 MG Tab Take 1 Tablet by mouth every day for 180 days. 30 Tablet 5    aspirin (ASPIRIN 81) 81 MG EC tablet Take 1 Tablet by mouth every day for 180 days. 90 Tablet 1    amitriptyline (ELAVIL) 50 MG Tab Take 50 mg by mouth every evening.         Fluids    Intake/Output Summary (Last 24 hours) at 2/16/2024 1606  Last data filed at 2/16/2024 1200  Gross per 24 hour   Intake 850 ml   Output 675 ml   Net 175 ml       Laboratory          Recent Labs     02/16/24  0540   SODIUM 137   POTASSIUM 4.1   CHLORIDE 104   CO2 22   BUN 12   CREATININE 0.54   MAGNESIUM 2.2   PHOSPHORUS 4.0   CALCIUM 8.6     Recent Labs     02/16/24  0540   GLUCOSE 135*     Recent Labs     02/16/24  0540   WBC 11.9*     Recent Labs     02/16/24  0540   RBC 4.16*   HEMOGLOBIN 13.2   HEMATOCRIT 39.0   PLATELETCT 177       Imaging  CT:    Reviewed    Assessment/Plan  Headache  Assessment & Plan  Patient with chronic recurrent and somewhat recalcitrant headache syndrome dating back many years.  On the morning of 2/16 with significant headache, photophobia, associated with nausea, described as very similar to priors  Given recent instrumentation head CT performed without acute ICH/subarachnoid    Compazine Benadryl given with resolution of headache and photophobia    Continue home therapy, recommended follow-up with headache specialist for better control and abortive plan    Cerebral aneurysm- (present on admission)  Assessment & Plan  -Reportedly patient had seizure-like activity during a stress test at an outside facility and imaging at that time found LICA aneurysm  -s/p stenting today  -serial neurologic exams  -goal normonatremia, normothermia, normoglycemia  -DAPT and statin  -SBP goal < 140  -Fall Precautions  -Aspiration Precautions      HLD  (hyperlipidemia)- (present on admission)  Assessment & Plan  -resume home statin    HTN (hypertension)- (present on admission)  Assessment & Plan  -on lisinopril and metoprolol XL as OP - resume in AM  -SBP goal < 140  -PRN's available         I have performed a physical exam and reviewed and updated ROS and Plan today (2/16/2024). In review of yesterday's note (2/15/2024), there are no changes except as documented above.     Discussed patient condition and risk of morbidity and/or mortality with Family, RN, RT, Therapies, Pharmacy, Patient, and neurology

## 2024-02-16 NOTE — DISCHARGE SUMMARY
Discharge Summary    CHIEF COMPLAINT ON ADMISSION  Elective admission for a left ICA aneurysm stent assisted coiling by interventional radiology    Reason for Admission  Cerebral aneurysm, nonruptured, left ICA    Admission Date  2/15/2024    CODE STATUS  Full Code    HPI & HOSPITAL COURSE  Dilia Johnston is a 59-year-old with PMH significant for HTN and HLD who presented today for elective left ICA aneurysm stenting.  Reportedly patient was having a cardiac stress test at outside facility (outside records not available) when she had seizure-like activity.  On a head CT at that time she was found to have a incidental left ICA aneurysm.  She was started on DAPT 5 days ago in anticipation of elective stent placement today.  Procedure was uncomplicated.  She was being admitted to the ICU postprocedure for serial neurologic exams and close hemodynamic monitoring.    On assessment, she has no focal neurodeficits.  She is hemodynamically stable. Her groin site had some oozing initially, but stopped after placement of sandbag. There is no evidence of hematoma formation.  The patient developed a migraine headache which she had before, Tylenol was effective.  The patient had a follow-up CT without abnormality, stent was found in appropriate position  The patient was otherwise back to her baseline, she does have a supply of aspirin 81 mg tablets as well as Plavix 75 mg tablets available with refills, as per interventional radiology this to be continued for 3 months, the patient will be brought back to the interventional radiology clinic for follow-up.  The patient was instructed to refrain from heavy lifting given the patient's groin access site.  The patient voices understanding, her  at the bedside as well being instructed on current plan of care.  The patient is otherwise stable for discharge and close outpatient follow-up      Therefore, she is discharged in good and stable condition to home with close outpatient  follow-up.    The patient recovered much more quickly than anticipated on admission.  The patient was admitted for elective admission that required ICU monitoring, critical care monitoring    Discharge Date  2/16/2024    FOLLOW UP ITEMS POST DISCHARGE  Close follow-up with primary care as well as the neurointerventional radiology clinic as follow-up    DISCHARGE DIAGNOSES  Active Problems:    HTN (hypertension) (POA: Yes)    HLD (hyperlipidemia) (POA: Yes)    Cerebral aneurysm (POA: Yes)  Resolved Problems:    * No resolved hospital problems. *      FOLLOW UP  Neurointerventional clinic follow-up asked to be scheduled    MEDICATIONS ON DISCHARGE     Medication List        CONTINUE taking these medications        Instructions   amitriptyline 50 MG Tabs  Commonly known as: Elavil   Take 50 mg by mouth every evening.  Dose: 50 mg     aspirin 81 MG EC tablet  Commonly known as: Aspirin 81   Take 1 Tablet by mouth every day for 180 days.  Dose: 81 mg     atorvastatin 40 MG Tabs  Commonly known as: Lipitor   Take 1 Tablet by mouth every day.  Dose: 1 Tablet     clopidogrel 75 MG Tabs  Commonly known as: Plavix   Take 1 Tablet by mouth every day for 180 days.  Dose: 75 mg     lisinopril 5 MG Tabs  Commonly known as: Prinivil   Take 1 Tablet by mouth every day.  Dose: 1 Tablet     metoprolol SR 25 MG Tb24  Commonly known as: Toprol XL   Take 25 mg by mouth every day.  Dose: 25 mg     Tylenol 325 MG Tabs  Generic drug: acetaminophen   Take 650 mg by mouth one time as needed for Mild Pain. 650 mg = 2 tabs  Dose: 650 mg              Allergies  No Known Allergies    DIET  Orders Placed This Encounter   Procedures    Diet Order Diet: Regular     Standing Status:   Standing     Number of Occurrences:   1     Order Specific Question:   Diet:     Answer:   Regular [1]    Discontinue Diet Tray     Standing Status:   Standing     Number of Occurrences:   1       ACTIVITY  As tolerated.  Weight bearing as tolerated  Patient instructed  to not lift heavy greater than 5 pounds for the next 2 weeks    CONSULTATIONS  Critical care, neurointerventional radiology    PROCEDURES  Zana Lemons M.D.  Physician  Radiology     OR Surgeon      Signed     Date of Service: 2/15/2024 12:03 PM       Immediate Post- Operative Note           Findings: left ICA aneurysm        Procedure(s): stent assisted coiling          Estimated Blood Loss: Less than 5 ml           Complications: None                 2/15/2024     12:03 PM     Zana Lemons M.D.             LABORATORY  Lab Results   Component Value Date    SODIUM 137 02/16/2024    POTASSIUM 4.1 02/16/2024    CHLORIDE 104 02/16/2024    CO2 22 02/16/2024    GLUCOSE 135 (H) 02/16/2024    BUN 12 02/16/2024    CREATININE 0.54 02/16/2024        Lab Results   Component Value Date    WBC 11.9 (H) 02/16/2024    HEMOGLOBIN 13.2 02/16/2024    HEMATOCRIT 39.0 02/16/2024    PLATELETCT 177 02/16/2024        Total time of the discharge process to the extent of 45 minutes.

## 2024-02-16 NOTE — CARE PLAN
The patient is Stable - Low risk of patient condition declining or worsening    Shift Goals  Clinical Goals: stable neuro, sbp<140  Patient Goals: rest  Family Goals: lauren    Progress made toward(s) clinical / shift goals:      Problem: Knowledge Deficit - Standard  Goal: Patient and family/care givers will demonstrate understanding of plan of care, disease process/condition, diagnostic tests and medications  Outcome: Progressing     Problem: Skin Integrity  Goal: Skin integrity is maintained or improved  Outcome: Progressing     Problem: Pain - Standard  Goal: Alleviation of pain or a reduction in pain to the patient’s comfort goal  Outcome: Progressing         Patient is not progressing towards the following goals: N/A

## 2024-02-16 NOTE — DISCHARGE INSTRUCTIONS
"Diet    Resume your normal diet as tolerated.  A diet low in cholesterol, fat, and sodium is recommended for good health.         Stroke Risk Factors    You are at increased risk of having another stroke event.  See your Patient Stroke Guide to help reduce your stroke risk. These are your specific risk factors:  Age - Over 55  High Cholesterol and lipids     Get help right away if you have any signs of a stroke.  \"BE FAST\" is an easy way to remember the main warning signs of a stroke:  B - Balance. Dizziness, sudden trouble walking, or loss of balance.  E - Eyes. Trouble seeing or a change in how you see.  F - Face. Sudden weakness or loss of feeling in the face. The face or eyelid may droop on one side.  A - Arms. Weakness or loss of feeling in an arm. This happens all of a sudden and most often on one side of the body.  S - Speech. Sudden trouble speaking, slurred speech, or trouble understanding what people say.  T - Time. Time to call emergency services. Write down what time symptoms started.    Resume your normal diet as tolerated.  A diet low in cholesterol, fat, and sodium is recommended for good health.     Activity    No Heavy Lifting    Do not lift anything heavier than 10 pounds  No heavy lifting for 1 week      No Strenuous Activity    No strenuous activity for 1 week.  You may tire easily; rest as needed during the day.      10 Pound Weight Restriction Post Surgery    You have a ten pound weight lifting restriction for four to six weeks after surgery. Do not lift anything heavier than a gallon of milk. Routine activities such as walking and using the stairs are safe. You may sleep in any position that is comfortable. Avoid strenuous activities and exercise that involves twisting, bending, and running.          "

## 2024-02-16 NOTE — CARE PLAN
The patient is Stable - Low risk of patient condition declining or worsening    Shift Goals  Clinical Goals: q30/q1 neuro, sbp<140  Patient Goals: rest  Family Goals: lauren    Progress made toward(s) clinical / shift goals:    Problem: Knowledge Deficit - Standard  Goal: Patient and family/care givers will demonstrate understanding of plan of care, disease process/condition, diagnostic tests and medications  Outcome: Progressing     Problem: Skin Integrity  Goal: Skin integrity is maintained or improved  Outcome: Progressing     Problem: Pain - Standard  Goal: Alleviation of pain or a reduction in pain to the patient’s comfort goal  Outcome: Progressing       Patient is not progressing towards the following goals:

## 2024-02-17 NOTE — ASSESSMENT & PLAN NOTE
Patient with chronic recurrent and somewhat recalcitrant headache syndrome dating back many years.  On the morning of 2/16 with significant headache, photophobia, associated with nausea, described as very similar to priors  Given recent instrumentation head CT performed without acute ICH/subarachnoid    Compazine Benadryl given with resolution of headache and photophobia    Continue home therapy, recommended follow-up with headache specialist for better control and abortive plan

## 2024-03-04 ASSESSMENT — ENCOUNTER SYMPTOMS
CARDIOVASCULAR NEGATIVE: 1
FEVER: 0
FOCAL WEAKNESS: 0
DIAPHORESIS: 0
HEMOPTYSIS: 0
WEIGHT LOSS: 0
CONSTITUTIONAL NEGATIVE: 1
CHILLS: 0
BLOOD IN STOOL: 0
SPEECH CHANGE: 0
GASTROINTESTINAL NEGATIVE: 1
SENSORY CHANGE: 0
WEAKNESS: 0
HEADACHES: 1

## 2024-03-04 ASSESSMENT — LIFESTYLE VARIABLES: SUBSTANCE_ABUSE: 0

## 2024-03-04 NOTE — PROGRESS NOTES
Neuro Interventional Service Consultation      Re: Dilia Johnston     MRN: [id number]   : 1965    Dilia Johnston is a 59 y.o. female seen in clinic for evaluation after intracranial aneurysm intervention.     Neurointerventional Radiologist: Zana Lemons MD  Neurosurgeon: Lizette GONZALEZ  PCP: Mat Houston MD  Cardiologist: Eddie Lambert MD     services were used in the patient's primary language of Hebrew.     Name or Number: [id number]  Mode of interpretation: iPad    Content of Interpretation:  Follow up after intracranial aneurysm repair     History of Present Illness:  Initial consultation 24:  presents with an incidental left ICA terminus aneurysm. She has a history of migraine headache that have increased in frequency during menopause. She describes one severe headache during a stress test, for which she was evaluated at [facility] in 2022. It was more severe than her chronic migraines. Imaging revealed an incidental left ICA terminus aneurysm about 6- 7 mm in size. She has been evaluated by neurosurgery and the patient has been referred to the Neuro Interventional Service for evaluation and management of this finding.     She is seen today for review of imaging studies and discussion of possible intracranial aneurysm intervention. Today, the patient reports headaches managed with daily Tylenol and ibuprofen. She describes her headaches are frontal and base of neck. She reports a seizure after an IV medication about 2 years ago, no subsequent seizures. She reports a MI about 2 years ago, and underwent a coronary angiogram with no intervention. She has been on medical management since that time. She denies current angina or other heart symptoms. There is no family history of brain aneurysm. Blood pressure is controlled with medications. There is no history of smoking. She denies current or past methamphetamine use. The patient has never anesthesia in the past.  "The patient has taken DAPT in the past and reports easy bruising, but denies any history of major bleeding, including epistaxis, hemoptysis, hematemesis, gross hematuria, and melena. She lives in [place] and works as an elementary . She is accompanied by a support person Shamar to today's consultation.    Interval history 3/5/24: Ms. Johnston underwent uncomplicated stent assisted coiling of her unruptured left ICA aneurysm on 2/15/24. She was monitored overnight and had a CT scan due to complaints of headache, which was negative. She was discharged to home the following day on a 90 day course of DAPT with Plavix and aspirin. Today, she reports she feels OK overall. She had a \"weird feeling\" on the left side of her head for a couple of days post procedure but it has resolved. Her right femoral angio access site is well healed without ecchymosis. She describes some mild generalized weakness. She is back to work and notes that since the coiling, she had some brief self-limited episodes of mild dyspnea when she was exerting herself in heavy activities, such as cleaning trash cans.They improve with rest breaks. She is not on any new medications. She is not particularly worried about them and they seem to be improving. She denies dizziness and lightheadedness. She is well established with a cardiologist and was taking aspirin prior to the coiling due to prior MI. She is accompanied by Shamar to the consultation.       Family history of aneurysm: no  Hyperlipidemia: yes, on medication  Hypertension: yes, on medication  Smoking: no  Diabetes Mellitus: no    Past Medical History:   Diagnosis Date    High cholesterol     Hypertension     Myocardial infarct (HCC) 2021    Seizure (HCC) 01/22/2024    During completion of stress test     Past Surgical History:   Procedure Laterality Date    HYSTEROSCOPY WITH VIDEO DIAGNOSTIC  3/5/2009    Performed by DANIELE PERDOMO at [facility]    DILATION AND " CURETTAGE  3/5/2009    Performed by DANIELE PERDOMO at [facility]     Social History     Socioeconomic History    Marital status:      Spouse name: Not on file    Number of children: Not on file    Years of education: Not on file    Highest education level: Not on file   Occupational History    Not on file   Tobacco Use    Smoking status: Never    Smokeless tobacco: Never   Vaping Use    Vaping Use: Never used   Substance and Sexual Activity    Alcohol use: Never    Drug use: Never    Sexual activity: Not on file   Other Topics Concern    Not on file   Social History Narrative    ** Merged History Encounter **          Social Determinants of Health     Financial Resource Strain: Not on file   Food Insecurity: Not on file   Transportation Needs: Not on file   Physical Activity: Not on file   Stress: Not on file   Social Connections: Not on file   Intimate Partner Violence: Not on file   Housing Stability: Not on file     No family history on file.    Review of Systems   Constitutional: Negative.  Negative for chills, diaphoresis, fever, malaise/fatigue and weight loss.   HENT:  Negative for nosebleeds.    Respiratory:  Positive for shortness of breath. Negative for hemoptysis.    Cardiovascular: Negative.  Negative for chest pain and claudication.   Gastrointestinal: Negative.  Negative for blood in stool and melena.   Skin: Negative.    Neurological:  Positive for headaches. Negative for sensory change, speech change, focal weakness and weakness.   Psychiatric/Behavioral:  Negative for substance abuse.      A comprehensive 14-point review of systems was negative except as described above.     Labs:    Latest Reference Range & Units 02/07/24 10:07 02/16/24 05:40   WBC 4.8 - 10.8 K/uL 6.7 11.9 (H)   RBC 4.20 - 5.40 M/uL 4.75 4.16 (L)   Hemoglobin 12.0 - 16.0 g/dL 14.8 13.2   Hematocrit 37.0 - 47.0 % 45.4 39.0   MCV 81.4 - 97.8 fL 95.6 93.8   MCH 27.0 - 33.0 pg 31.2 31.7   MCHC 32.2 - 35.5  g/dL 32.6 33.8   RDW 35.9 - 50.0 fL 45.5 45.1   Platelet Count 164 - 446 K/uL 176 177   MPV 9.0 - 12.9 fL 11.9 11.7      Latest Reference Range & Units 02/07/24 10:07 02/16/24 05:40   Sodium 135 - 145 mmol/L 139 137   Potassium 3.6 - 5.5 mmol/L 4.4 4.1   Chloride 96 - 112 mmol/L 104 104   Co2 20 - 33 mmol/L 27 22   Anion Gap 7.0 - 16.0  8.0 11.0   Glucose 65 - 99 mg/dL 103 (H) 135 (H)   Bun 8 - 22 mg/dL 16 12   Creatinine 0.50 - 1.40 mg/dL 0.70 0.54   GFR (CKD-EPI) >60 mL/min/1.73 m 2 100 107   Calcium 8.5 - 10.5 mg/dL 9.2 8.6   Phosphorus 2.5 - 4.5 mg/dL  4.0   Magnesium 1.5 - 2.5 mg/dL  2.2   (H): Data is abnormally high   Latest Reference Range & Units 02/07/24 10:07 02/15/24 09:39   PT 12.0 - 14.6 sec 13.2    INR 0.87 - 1.13  0.99    Reaction Time Initial-R 4.6 - 9.1 min  5.8   Clot Kinetics-K 0.8 - 2.1 min  1.6   Clot Angle-Angle 63.0 - 78.0 degrees  68.3   Maximum Clot Strength-MA 52.0 - 69.0 mm  57.9   Lysis 30 minutes-LY30 0.0 - 2.6 %  1.4   % Inhibition AA 0.0 - 11.0 %  94.2 (H)   % Inhibition ADP 0.0 - 17.0 %  69.4 (H)   React Time Initial Hep 4.3 - 8.3 min  6.4   TEG Functional Fibrinogen(MA) 15.0 - 32.0 mm  19.0   TEG Algorithm   Link Algorithm   (H): Data is abnormally high    Radiology:   CT head w/o 2/16/24 at [facility]:  1.  Coil and stent metallic artifact in the vicinity of the left supraclinoid ICA  2.  Otherwise, unremarkable head CT.  3.  No evidence of acute subarachnoid hemorrhage.      SHANNON procedure 2/15/24 at [facility]:  Successful stent-assisted endovascular repair of LEFT supraclinoid internal carotid aneurysm.       CTA Head 6/16/22 at [facility]:          CT Head w/o 6/16/22 at [facility]:        Current Outpatient Medications   Medication Sig Dispense Refill    acetaminophen (TYLENOL) 325 MG Tab Take 650 mg by mouth one time as needed for Mild Pain. 650 mg = 2 tabs      metoprolol SR (TOPROL XL) 25 MG TABLET SR 24 HR Take 25 mg by mouth every day.      lisinopril  (PRINIVIL) 5 MG Tab Take 1 Tablet by mouth every day.      atorvastatin (LIPITOR) 40 MG Tab Take 1 Tablet by mouth every day.      clopidogrel (PLAVIX) 75 MG Tab Take 1 Tablet by mouth every day for 180 days. 30 Tablet 5    aspirin (ASPIRIN 81) 81 MG EC tablet Take 1 Tablet by mouth every day for 180 days. 90 Tablet 1    amitriptyline (ELAVIL) 50 MG Tab Take 50 mg by mouth every evening.       No current facility-administered medications for this visit.       No Known Allergies    Physical Exam  Constitutional:       General: She is not in acute distress.     Appearance: She is not diaphoretic.   HENT:      Head: Normocephalic.   Eyes:      General: No scleral icterus.  Pulmonary:      Effort: Pulmonary effort is normal. No respiratory distress.   Abdominal:      General: There is no distension.   Skin:     General: Skin is warm and dry.      Coloration: Skin is not pale.      Findings: No erythema or rash.   Neurological:      General: No focal deficit present.      Mental Status: She is alert and oriented to person, place, and time. She is not disoriented.      Cranial Nerves: No cranial nerve deficit or facial asymmetry.      Sensory: Sensation is intact.      Motor: No weakness or tremor.      Coordination: Coordination normal.      Gait: Gait is intact. Gait normal.   Psychiatric:         Mood and Affect: Mood and affect normal.         Behavior: Behavior normal.         Thought Content: Thought content normal.         Cognition and Memory: Memory normal.         Judgment: Judgment normal.     PHASES Aneurysm Risk Score: 2    The 5-year absolute risk of rupture based on score is currently estimated to be:    ?2 points: 0.4%  3 points: 0.7%  4 points: 0.9%  5 points: 1.3%  6 points: 1.7%  7 points: 2.4%  8 points: 3.2%  9 points: 4.3%  10 points: 5.3%  11 points: 7.2%  ? 12 points: 17.8%    Impression:   1. Unruptured left internal carotid artery aneurysm 6- 7 mm in size, status post stent assisted coil  embolization.  2. Chronic headaches.  3. Coronary artery disease, status post myocardial infarction.  4. Hypertension, controlled.  5. Hyperlipidemia.    Plan:   We discussed the method of the procedure at length and reviewed imaging studies. We discussed the method of the procedure at length including the use of catheters, contrast, and xrays to facilitate diagnostic procedures and stents and embolic agents to perform endovascular intervention. All questions were answered. We asked that she follow up with her cardiologist if her mild dyspnea symptoms do not improve, and to activate EMS for severe dyspnea or chest pain. We explained that the patient will need to have surveillance imaging after the procedure to monitor for recurrence of the condition, which will be managed by us, and that future treatment depends on multiple factors including lab studies, imaging, and performance status.We reviewed known risk factors for vascular health that include hypertension, hyperglycemia, hyperlipidemia, and tobacco use, and modifiable risk factors were discussed. We have planned that the next imaging study will a diagnostic conventional angiogram performed in 1 year (February 2025). DAPT with Plavix and aspirin should be continued for 90 days with no interruption (stop date May 15, 2024). Continue aspirin per cardiology thereafter. Side effects, specifically bleeding, were again reviewed with instructions to contact us for minor bleeding and seek emergency care for hemorrhage.       CARLOS Zhong   Neuro Interventional Service   [address] [id number] [facility] [id number])  [place] [id number]  [phone]

## 2024-03-05 ENCOUNTER — HOSPITAL ENCOUNTER (OUTPATIENT)
Dept: RADIOLOGY | Facility: MEDICAL CENTER | Age: 59
End: 2024-03-05
Attending: NURSE PRACTITIONER
Payer: COMMERCIAL

## 2024-03-05 DIAGNOSIS — I67.1 CEREBRAL ANEURYSM: ICD-10-CM

## 2024-03-05 ASSESSMENT — ENCOUNTER SYMPTOMS
CLAUDICATION: 0
SHORTNESS OF BREATH: 1

## 2024-04-17 ENCOUNTER — OFFICE VISIT (OUTPATIENT)
Dept: URGENT CARE | Facility: CLINIC | Age: 59
End: 2024-04-17
Payer: COMMERCIAL

## 2024-04-17 VITALS
HEIGHT: 62 IN | SYSTOLIC BLOOD PRESSURE: 138 MMHG | HEART RATE: 86 BPM | RESPIRATION RATE: 20 BRPM | WEIGHT: 165.6 LBS | OXYGEN SATURATION: 96 % | BODY MASS INDEX: 30.47 KG/M2 | DIASTOLIC BLOOD PRESSURE: 88 MMHG | TEMPERATURE: 98.7 F

## 2024-04-17 DIAGNOSIS — J22 LRTI (LOWER RESPIRATORY TRACT INFECTION): ICD-10-CM

## 2024-04-17 LAB
FLUAV RNA SPEC QL NAA+PROBE: NEGATIVE
FLUBV RNA SPEC QL NAA+PROBE: NEGATIVE
RSV RNA SPEC QL NAA+PROBE: NEGATIVE
S PYO DNA SPEC NAA+PROBE: NOT DETECTED
SARS-COV-2 RNA RESP QL NAA+PROBE: NEGATIVE

## 2024-04-17 PROCEDURE — 0241U POCT CEPHEID COV-2, FLU A/B, RSV - PCR: CPT | Performed by: PHYSICIAN ASSISTANT

## 2024-04-17 PROCEDURE — 3075F SYST BP GE 130 - 139MM HG: CPT | Performed by: PHYSICIAN ASSISTANT

## 2024-04-17 PROCEDURE — 3079F DIAST BP 80-89 MM HG: CPT | Performed by: PHYSICIAN ASSISTANT

## 2024-04-17 PROCEDURE — 99213 OFFICE O/P EST LOW 20 MIN: CPT | Performed by: PHYSICIAN ASSISTANT

## 2024-04-17 PROCEDURE — 87651 STREP A DNA AMP PROBE: CPT | Performed by: PHYSICIAN ASSISTANT

## 2024-04-17 RX ORDER — DOXYCYCLINE HYCLATE 100 MG
100 TABLET ORAL 2 TIMES DAILY
Qty: 14 TABLET | Refills: 0 | Status: SHIPPED | OUTPATIENT
Start: 2024-04-17 | End: 2024-04-24

## 2024-04-17 ASSESSMENT — ENCOUNTER SYMPTOMS
SWOLLEN GLANDS: 0
COUGH: 1
FEVER: 0
SPUTUM PRODUCTION: 1
HEADACHES: 0
STRIDOR: 0
VOMITING: 0
DIARRHEA: 0
SHORTNESS OF BREATH: 0
SORE THROAT: 1
CHILLS: 0
MYALGIAS: 0
NAUSEA: 0

## 2024-04-17 NOTE — LETTER
April 17, 2024         Patient: Dilia Johnston   YOB: 1965   Date of Visit: 4/17/2024           To Whom it May Concern:    Dilia Johnston was seen in my clinic on 4/17/2024. She should be excused from work 4/17/24-4/19/24 due to medical illness.    If you have any questions or concerns, please don't hesitate to call.        Sincerely,           Velia Maddox P.A.-C.  Electronically Signed

## 2024-04-18 NOTE — PROGRESS NOTES
"Subjective     Dilia Johnston is a 59 y.o. female who presents with Pharyngitis (X1day bloody phlegm/fatigue/)             services used    Pharyngitis   This is a new problem. The current episode started yesterday. The problem has been rapidly worsening. There has been no fever. The pain is moderate. Associated symptoms include congestion and coughing (with blood-tinged mucous). Pertinent negatives include no diarrhea, ear pain, headaches, shortness of breath, stridor, swollen glands or vomiting. Associated symptoms comments: fatigue. She has tried nothing for the symptoms.         Past Medical History:   Diagnosis Date    High cholesterol     Hypertension     Myocardial infarct (HCC) 2021    Seizure (HCC) 01/22/2024    During completion of stress test       Past Surgical History:   Procedure Laterality Date    HYSTEROSCOPY WITH VIDEO DIAGNOSTIC  3/5/2009    Performed by DANIELE PERDOMO at [facility]    DILATION AND CURETTAGE  3/5/2009    Performed by DANIELE PERDOMO at [facility]       No family history on file.    Patient has no known allergies.    Medications, Allergies, and current problem list reviewed today in Epic    Review of Systems   Constitutional:  Positive for malaise/fatigue. Negative for chills and fever.   HENT:  Positive for congestion and sore throat. Negative for ear pain.    Respiratory:  Positive for cough (with blood-tinged mucous) and sputum production. Negative for shortness of breath and stridor.    Cardiovascular:  Negative for chest pain and leg swelling.   Gastrointestinal:  Negative for diarrhea, nausea and vomiting.   Musculoskeletal:  Negative for myalgias.   Skin:  Positive for rash.   Neurological:  Negative for headaches.        All other systems reviewed and are negative.         Objective     /88   Pulse 86   Temp 37.1 °C (98.7 °F) (Temporal)   Resp 20   Ht 1.575 m (5' 2\")   Wt 75.1 kg (165 lb 9.6 oz)   LMP " 01/22/2009   SpO2 96%   BMI 30.29 kg/m²      Physical Exam  Constitutional:       General: She is not in acute distress.     Appearance: She is not ill-appearing.   HENT:      Head: Normocephalic and atraumatic.      Mouth/Throat:      Mouth: Mucous membranes are moist.      Pharynx: Posterior oropharyngeal erythema present. No oropharyngeal exudate.   Eyes:      Conjunctiva/sclera: Conjunctivae normal.   Cardiovascular:      Rate and Rhythm: Normal rate and regular rhythm.      Heart sounds: Normal heart sounds. No murmur heard.  Pulmonary:      Effort: Pulmonary effort is normal. No respiratory distress.      Breath sounds: Normal breath sounds. No wheezing, rhonchi or rales.   Skin:     General: Skin is warm and dry.      Findings: No rash.   Neurological:      General: No focal deficit present.      Mental Status: She is alert and oriented to person, place, and time.   Psychiatric:         Mood and Affect: Mood normal.         Behavior: Behavior normal.         Thought Content: Thought content normal.         Judgment: Judgment normal.                             Assessment & Plan        1. LRTI (lower respiratory tract infection)    - POCT CoV-2, Flu A/B, RSV by PCR- negative  - POCT CEPHEID GROUP A STREP - PCR- negative  - doxycycline (VIBRAMYCIN) 100 MG Tab; Take 1 Tablet by mouth 2 times a day for 7 days.  Dispense: 14 Tablet; Refill: 0    Differential diagnoses, Supportive care, and indications for immediate follow-up discussed with patient.   Pathogenesis of diagnosis discussed including typical length and natural progression.   Instructed to return to clinic or nearest emergency department for any change in condition, further concerns, or worsening of symptoms.    The patient demonstrated a good understanding and agreed with the treatment plan.    Velia Maddox P.A.-C.

## 2024-05-06 ENCOUNTER — OFFICE VISIT (OUTPATIENT)
Dept: URGENT CARE | Facility: CLINIC | Age: 59
End: 2024-05-06
Payer: COMMERCIAL

## 2024-05-06 ENCOUNTER — HOSPITAL ENCOUNTER (EMERGENCY)
Facility: MEDICAL CENTER | Age: 59
End: 2024-05-06
Attending: EMERGENCY MEDICINE
Payer: COMMERCIAL

## 2024-05-06 VITALS
BODY MASS INDEX: 30.36 KG/M2 | DIASTOLIC BLOOD PRESSURE: 78 MMHG | HEIGHT: 62 IN | SYSTOLIC BLOOD PRESSURE: 126 MMHG | TEMPERATURE: 98.2 F | HEART RATE: 82 BPM | WEIGHT: 165 LBS | RESPIRATION RATE: 20 BRPM | OXYGEN SATURATION: 96 %

## 2024-05-06 VITALS
OXYGEN SATURATION: 96 % | TEMPERATURE: 97.8 F | HEART RATE: 81 BPM | HEIGHT: 62 IN | DIASTOLIC BLOOD PRESSURE: 65 MMHG | WEIGHT: 166.67 LBS | RESPIRATION RATE: 21 BRPM | BODY MASS INDEX: 30.67 KG/M2 | SYSTOLIC BLOOD PRESSURE: 139 MMHG

## 2024-05-06 DIAGNOSIS — J02.9 PHARYNGITIS, UNSPECIFIED ETIOLOGY: ICD-10-CM

## 2024-05-06 DIAGNOSIS — J06.9 UPPER RESPIRATORY TRACT INFECTION, UNSPECIFIED TYPE: ICD-10-CM

## 2024-05-06 DIAGNOSIS — Z86.79 HISTORY OF CEREBRAL ANEURYSM: ICD-10-CM

## 2024-05-06 DIAGNOSIS — R05.1 ACUTE COUGH: ICD-10-CM

## 2024-05-06 DIAGNOSIS — R55 VASOVAGAL EPISODE: ICD-10-CM

## 2024-05-06 DIAGNOSIS — R55 SYNCOPE, UNSPECIFIED SYNCOPE TYPE: ICD-10-CM

## 2024-05-06 DIAGNOSIS — R05.8 SPASMODIC COUGH: ICD-10-CM

## 2024-05-06 PROBLEM — E55.9 VITAMIN D DEFICIENCY: Status: ACTIVE | Noted: 2021-04-13

## 2024-05-06 PROBLEM — G43.009 MIGRAINE WITHOUT AURA: Status: ACTIVE | Noted: 2021-04-13

## 2024-05-06 PROBLEM — I10 HYPERTENSIVE DISORDER: Status: ACTIVE | Noted: 2021-04-13

## 2024-05-06 LAB
EKG IMPRESSION: NORMAL
FLUAV RNA SPEC QL NAA+PROBE: NEGATIVE
FLUBV RNA SPEC QL NAA+PROBE: NEGATIVE
RSV RNA SPEC QL NAA+PROBE: NEGATIVE
S PYO DNA SPEC NAA+PROBE: NOT DETECTED
SARS-COV-2 RNA RESP QL NAA+PROBE: NOTDETECTED

## 2024-05-06 PROCEDURE — 3074F SYST BP LT 130 MM HG: CPT

## 2024-05-06 PROCEDURE — 99215 OFFICE O/P EST HI 40 MIN: CPT | Mod: 25

## 2024-05-06 PROCEDURE — 94640 AIRWAY INHALATION TREATMENT: CPT

## 2024-05-06 PROCEDURE — 87651 STREP A DNA AMP PROBE: CPT

## 2024-05-06 PROCEDURE — 3078F DIAST BP <80 MM HG: CPT

## 2024-05-06 RX ORDER — PREDNISONE 20 MG/1
60 TABLET ORAL DAILY
Qty: 12 TABLET | Refills: 0 | Status: SHIPPED | OUTPATIENT
Start: 2024-05-07 | End: 2024-05-11

## 2024-05-06 RX ORDER — ALBUTEROL SULFATE 2.5 MG/3ML
2.5 SOLUTION RESPIRATORY (INHALATION) ONCE
Status: COMPLETED | OUTPATIENT
Start: 2024-05-06 | End: 2024-05-06

## 2024-05-06 RX ORDER — FEXOFENADINE HCL AND PSEUDOEPHEDRINE HCI 180; 240 MG/1; MG/1
1 TABLET, EXTENDED RELEASE ORAL DAILY
Qty: 7 TABLET | Refills: 0 | Status: SHIPPED | OUTPATIENT
Start: 2024-05-06 | End: 2024-05-13

## 2024-05-06 RX ORDER — PREDNISONE 20 MG/1
60 TABLET ORAL DAILY
Status: DISCONTINUED | OUTPATIENT
Start: 2024-05-06 | End: 2024-05-06 | Stop reason: HOSPADM

## 2024-05-06 RX ORDER — LIDOCAINE HYDROCHLORIDE 20 MG/ML
5 SOLUTION OROPHARYNGEAL ONCE
Status: COMPLETED | OUTPATIENT
Start: 2024-05-06 | End: 2024-05-06

## 2024-05-06 RX ADMIN — LIDOCAINE HYDROCHLORIDE 5 ML: 20 SOLUTION OROPHARYNGEAL at 14:29

## 2024-05-06 RX ADMIN — ALBUTEROL SULFATE 2.5 MG: 2.5 SOLUTION RESPIRATORY (INHALATION) at 14:31

## 2024-05-06 RX ADMIN — PREDNISONE 60 MG: 20 TABLET ORAL at 16:42

## 2024-05-06 ASSESSMENT — LIFESTYLE VARIABLES: DO YOU DRINK ALCOHOL: NO

## 2024-05-06 NOTE — ED NOTES
Patient has been provided written and verbal education on syncope and bronchitis. She has been instructed on how to take her prescribed medications and to obtained them from her pharmacy. She verbalized understanding.     Interpretor # [id number] used to completed DC.

## 2024-05-06 NOTE — ED TRIAGE NOTES
"Chief Complaint   Patient presents with    Syncope     While having an albuterol treatment at urgent care today    Cough     Starting yesterday    Sore Throat     X3 weeks    /78   Pulse 83   Temp 36.8 °C (98.2 °F) (Temporal)   Resp 20   Ht 1.575 m (5' 2\")   Wt 75.6 kg (166 lb 10.7 oz)   LMP 01/22/2009   SpO2 100%   BMI 30.48 kg/m²     Pt reports she has had a sore throat for three week but that the cough started yesterday. Pt reports that she was on antibiotics but they did no help. Pt to EKG room. Pt educated on triage process and thanked for patience.     "

## 2024-05-06 NOTE — PROGRESS NOTES
Subjective:   Dilia Johnston is a 59 y.o. female who presents for Follow-Up (K3ixotl cough/green dry/bloody phlegm/sore throat/loss of voice/pt stated not feeling better )      HPI:    Patient returns urgent care with concerns of continued sore throat, cough.  She is accompanied by her  who is assisting with history.  Symptoms started 3 weeks ago.  Reports cough is mostly dry, intermittently productive of green/bloody sputum  Reports hoarse voice.   Was previously seen in  on 04/17/24 when her symptoms started and was treated for lower respiratory tract infection. Patient states she feels like she is getting worse. Describes cough as spasmodic resulting in stress incontinence.   Denies pmh of asthma, copd, smoking.  Denies wheezing, but is starting to experience chest tightness and SOB with activity. Reports she is established with [facility] cardiologist Dr. Lambert for RBBB.  Denies fever, chils  Tolerating oral intake. Reports normal urinary output  Denies rash  Denies presyncopal episodes but her      ROS As above in HPI    Medications:    Current Outpatient Medications on File Prior to Visit   Medication Sig Dispense Refill    acetaminophen (TYLENOL) 325 MG Tab Take 650 mg by mouth one time as needed for Mild Pain. 650 mg = 2 tabs      metoprolol SR (TOPROL XL) 25 MG TABLET SR 24 HR Take 25 mg by mouth every day.      lisinopril (PRINIVIL) 5 MG Tab Take 1 Tablet by mouth every day.      atorvastatin (LIPITOR) 40 MG Tab Take 1 Tablet by mouth every day.      clopidogrel (PLAVIX) 75 MG Tab Take 1 Tablet by mouth every day for 180 days. 30 Tablet 5    aspirin (ASPIRIN 81) 81 MG EC tablet Take 1 Tablet by mouth every day for 180 days. 90 Tablet 1    amitriptyline (ELAVIL) 50 MG Tab Take 50 mg by mouth every evening.       No current facility-administered medications on file prior to visit.        Allergies:   Patient has no known allergies.    Problem List:   Patient Active Problem List   Diagnosis    HTN  "(hypertension)    HLD (hyperlipidemia)    Cerebral aneurysm    Headache    Migraine without aura    Vitamin D deficiency    Hypertensive disorder        Surgical History:  Past Surgical History:   Procedure Laterality Date    HYSTEROSCOPY WITH VIDEO DIAGNOSTIC  3/5/2009    Performed by DANIELE PERDOMO at SURGERY SAME DAY [facility] ORS    DILATION AND CURETTAGE  3/5/2009    [facility]       Past Social Hx:   Social History     Tobacco Use    Smoking status: Never    Smokeless tobacco: Never   Vaping Use    Vaping Use: Never used   Substance Use Topics    Alcohol use: Never    Drug use: Never          Problem list, medications, and allergies reviewed by myself today in Epic.     Objective:     /78   Pulse 82   Temp 36.8 °C (98.2 °F) (Temporal)   Resp 20   Ht 1.575 m (5' 2\")   Wt 74.8 kg (165 lb)   LMP 01/22/2009   SpO2 96%   BMI 30.18 kg/m²     Physical Exam  Vitals and nursing note reviewed.   Constitutional:       General: She is not in acute distress.     Appearance: Normal appearance. She is not ill-appearing.   HENT:      Head: Normocephalic.      Right Ear: Tympanic membrane and ear canal normal.      Left Ear: Tympanic membrane and ear canal normal.      Nose: Congestion and rhinorrhea present. Rhinorrhea is clear.      Right Sinus: No maxillary sinus tenderness or frontal sinus tenderness.      Left Sinus: No maxillary sinus tenderness or frontal sinus tenderness.      Mouth/Throat:      Mouth: Mucous membranes are moist.      Pharynx: Oropharynx is clear. Uvula midline. Posterior oropharyngeal erythema present. No pharyngeal swelling or oropharyngeal exudate.      Tonsils: No tonsillar exudate. 2+ on the right. 2+ on the left.   Cardiovascular:      Rate and Rhythm: Normal rate and regular rhythm.      Heart sounds: Normal heart sounds. No murmur heard.     No friction rub. No gallop.   Pulmonary:      Effort: Pulmonary effort is normal. No " respiratory distress.      Breath sounds: Normal breath sounds. No stridor. No wheezing, rhonchi or rales.   Chest:      Chest wall: No tenderness.   Abdominal:      General: Bowel sounds are normal.      Palpations: Abdomen is soft.   Musculoskeletal:      Cervical back: No rigidity or tenderness.   Lymphadenopathy:      Cervical: No cervical adenopathy.   Skin:     General: Skin is warm and dry.      Capillary Refill: Capillary refill takes less than 2 seconds.      Findings: No rash.   Neurological:      Mental Status: She is alert and oriented to person, place, and time.         Assessment/Plan:       Results for orders placed or performed in visit on 05/06/24   POCT GROUP A STREP, PCR   Result Value Ref Range    POC Group A Strep, PCR Not Detected Not Detected, Invalid       Diagnosis and associated orders:   1. Syncope, unspecified syncope type  - EKG - Clinic Performed    2. History of cerebral aneurysm    3. Spasmodic cough  - albuterol (Proventil) 2.5mg/3ml nebulizer solution 2.5 mg    4. Pharyngitis, unspecified etiology  - POCT GROUP A STREP, PCR  - lidocaine (Xylocaine) 2 % viscous solution 5 mL        Comments/MDM:       Patient had a syncopal episode while patient was receiving albuterol treatment in office. Per her , she told him the nebulizer vial was empty and to shut off the machine, after which, she states she slid out of the chair onto the floor. The patient's  came out of the room seeking assistance. The patient was found on the floor laying on her back, with her eyes open, seeming to be gasping for air. A regular pulse was present and the patient had clear breath sounds during inhalation and exhalation. The patient was rolled on to her side, and with the assistance of her  and this provider we were able to get her into a chair. The patient slowly became more talkative during this transition and then a mild generalized body tremor started once she was in the chair. She was  "very tearful and shaky. She does not remember sliding out of the chair, laying on the floor, or being assisted onto the chair. Denies head injury, knee, hip, or wrist pain. Possible vasovagal vs seizure like activity. EKG obtained, patient is in SR with RBBB consistent with previous EKG. Patient's  reports \"she has been doing this for years.\" Chart reviewed, and patient had cerebral aneurysm coil placed in IR in February of this year. Rachel called and while on the phone the patient's  declined EMS and states he will take her to the ER himself. Patient does not appear to be established with neurology and is not taking antiseizure medications for recurrence of seizures. Transfer center notified by phone. Patient instructed to go to [facility] for evaluation. Patient and her  expressed understanding and consented to ER transfer. Follow up with PCP advised.       Return to clinic or go to ED if symptoms worsen or persist. Indications for ED discussed at length. Patient/Parent/Guardian voices understanding. Follow-up with your primary care provider in 3-5 days. Red flag symptoms discussed. All side effects of medication discussed including allergic response, GI upset, tendon injury, rash, sedation etc.    Please note that this dictation was created using voice recognition software. I have made a reasonable attempt to correct obvious errors, but I expect that there are errors of grammar and possibly content that I did not discover before finalizing the note.    This note was electronically signed by CARLOS Mc    "

## 2024-07-05 ENCOUNTER — HOSPITAL ENCOUNTER (OUTPATIENT)
Dept: RADIOLOGY | Facility: MEDICAL CENTER | Age: 59
End: 2024-07-05
Attending: PSYCHIATRY & NEUROLOGY
Payer: COMMERCIAL

## 2024-07-05 DIAGNOSIS — R56.9 CONVULSIONS, UNSPECIFIED CONVULSION TYPE (HCC): ICD-10-CM

## 2024-07-05 DIAGNOSIS — G44.53 PRIMARY THUNDERCLAP HEADACHE: ICD-10-CM

## 2024-07-05 PROCEDURE — 700117 HCHG RX CONTRAST REV CODE 255: Mod: JZ

## 2024-07-05 PROCEDURE — A9579 GAD-BASE MR CONTRAST NOS,1ML: HCPCS | Mod: JZ

## 2024-07-05 PROCEDURE — 70553 MRI BRAIN STEM W/O & W/DYE: CPT

## 2024-07-05 RX ADMIN — GADOTERIDOL 16 ML: 279.3 INJECTION, SOLUTION INTRAVENOUS at 16:07

## 2024-08-01 ENCOUNTER — HOSPITAL ENCOUNTER (OUTPATIENT)
Dept: LAB | Facility: MEDICAL CENTER | Age: 59
End: 2024-08-01
Attending: PSYCHIATRY & NEUROLOGY
Payer: COMMERCIAL

## 2024-08-01 LAB
25(OH)D3 SERPL-MCNC: 20 NG/ML (ref 30–100)
ALBUMIN SERPL BCP-MCNC: 4.1 G/DL (ref 3.2–4.9)
ALBUMIN/GLOB SERPL: 1.4 G/DL
ALP SERPL-CCNC: 110 U/L (ref 30–99)
ALT SERPL-CCNC: 23 U/L (ref 2–50)
ANION GAP SERPL CALC-SCNC: 12 MMOL/L (ref 7–16)
AST SERPL-CCNC: 18 U/L (ref 12–45)
BASOPHILS # BLD AUTO: 0.7 % (ref 0–1.8)
BASOPHILS # BLD: 0.04 K/UL (ref 0–0.12)
BILIRUB SERPL-MCNC: 0.3 MG/DL (ref 0.1–1.5)
BUN SERPL-MCNC: 16 MG/DL (ref 8–22)
CALCIUM ALBUM COR SERPL-MCNC: 9.5 MG/DL (ref 8.5–10.5)
CALCIUM SERPL-MCNC: 9.6 MG/DL (ref 8.5–10.5)
CHLORIDE SERPL-SCNC: 109 MMOL/L (ref 96–112)
CO2 SERPL-SCNC: 21 MMOL/L (ref 20–33)
CREAT SERPL-MCNC: 0.76 MG/DL (ref 0.5–1.4)
EOSINOPHIL # BLD AUTO: 0.06 K/UL (ref 0–0.51)
EOSINOPHIL NFR BLD: 1.1 % (ref 0–6.9)
ERYTHROCYTE [DISTWIDTH] IN BLOOD BY AUTOMATED COUNT: 46.3 FL (ref 35.9–50)
GFR SERPLBLD CREATININE-BSD FMLA CKD-EPI: 90 ML/MIN/1.73 M 2
GLOBULIN SER CALC-MCNC: 3 G/DL (ref 1.9–3.5)
GLUCOSE SERPL-MCNC: 114 MG/DL (ref 65–99)
HCT VFR BLD AUTO: 43.3 % (ref 37–47)
HGB BLD-MCNC: 14.3 G/DL (ref 12–16)
IMM GRANULOCYTES # BLD AUTO: 0.01 K/UL (ref 0–0.11)
IMM GRANULOCYTES NFR BLD AUTO: 0.2 % (ref 0–0.9)
LYMPHOCYTES # BLD AUTO: 2.25 K/UL (ref 1–4.8)
LYMPHOCYTES NFR BLD: 40 % (ref 22–41)
MCH RBC QN AUTO: 30.8 PG (ref 27–33)
MCHC RBC AUTO-ENTMCNC: 33 G/DL (ref 32.2–35.5)
MCV RBC AUTO: 93.1 FL (ref 81.4–97.8)
MONOCYTES # BLD AUTO: 0.46 K/UL (ref 0–0.85)
MONOCYTES NFR BLD AUTO: 8.2 % (ref 0–13.4)
NEUTROPHILS # BLD AUTO: 2.81 K/UL (ref 1.82–7.42)
NEUTROPHILS NFR BLD: 49.8 % (ref 44–72)
NRBC # BLD AUTO: 0 K/UL
NRBC BLD-RTO: 0 /100 WBC (ref 0–0.2)
PLATELET # BLD AUTO: 179 K/UL (ref 164–446)
PMV BLD AUTO: 12.6 FL (ref 9–12.9)
POTASSIUM SERPL-SCNC: 4.1 MMOL/L (ref 3.6–5.5)
PROT SERPL-MCNC: 7.1 G/DL (ref 6–8.2)
RBC # BLD AUTO: 4.65 M/UL (ref 4.2–5.4)
SODIUM SERPL-SCNC: 142 MMOL/L (ref 135–145)
WBC # BLD AUTO: 5.6 K/UL (ref 4.8–10.8)

## 2024-08-01 PROCEDURE — 36415 COLL VENOUS BLD VENIPUNCTURE: CPT

## 2024-08-01 PROCEDURE — 80053 COMPREHEN METABOLIC PANEL: CPT

## 2024-08-01 PROCEDURE — 82306 VITAMIN D 25 HYDROXY: CPT

## 2024-08-01 PROCEDURE — 85025 COMPLETE CBC W/AUTO DIFF WBC: CPT

## 2024-08-01 PROCEDURE — 80203 DRUG SCREEN QUANT ZONISAMIDE: CPT

## 2024-08-03 LAB — ZONISAMIDE SERPL-MCNC: 11 UG/ML (ref 10–40)

## 2024-10-07 ENCOUNTER — HOSPITAL ENCOUNTER (OUTPATIENT)
Dept: LAB | Facility: MEDICAL CENTER | Age: 59
End: 2024-10-07
Attending: INTERNAL MEDICINE
Payer: COMMERCIAL

## 2024-10-07 LAB
ALBUMIN SERPL BCP-MCNC: 4.3 G/DL (ref 3.2–4.9)
ALBUMIN/GLOB SERPL: 1.5 G/DL
ALP SERPL-CCNC: 108 U/L (ref 30–99)
ALT SERPL-CCNC: 22 U/L (ref 2–50)
ANION GAP SERPL CALC-SCNC: 9 MMOL/L (ref 7–16)
AST SERPL-CCNC: 21 U/L (ref 12–45)
BILIRUB SERPL-MCNC: 0.2 MG/DL (ref 0.1–1.5)
BUN SERPL-MCNC: 13 MG/DL (ref 8–22)
CALCIUM ALBUM COR SERPL-MCNC: 9.2 MG/DL (ref 8.5–10.5)
CALCIUM SERPL-MCNC: 9.4 MG/DL (ref 8.5–10.5)
CHLORIDE SERPL-SCNC: 110 MMOL/L (ref 96–112)
CHOLEST SERPL-MCNC: 120 MG/DL (ref 100–199)
CO2 SERPL-SCNC: 23 MMOL/L (ref 20–33)
CREAT SERPL-MCNC: 0.79 MG/DL (ref 0.5–1.4)
EST. AVERAGE GLUCOSE BLD GHB EST-MCNC: 137 MG/DL
FASTING STATUS PATIENT QL REPORTED: NORMAL
GFR SERPLBLD CREATININE-BSD FMLA CKD-EPI: 86 ML/MIN/1.73 M 2
GLOBULIN SER CALC-MCNC: 2.8 G/DL (ref 1.9–3.5)
GLUCOSE SERPL-MCNC: 108 MG/DL (ref 65–99)
HBA1C MFR BLD: 6.4 % (ref 4–5.6)
HDLC SERPL-MCNC: 36 MG/DL
LDLC SERPL CALC-MCNC: 61 MG/DL
POTASSIUM SERPL-SCNC: 4.3 MMOL/L (ref 3.6–5.5)
PROT SERPL-MCNC: 7.1 G/DL (ref 6–8.2)
SODIUM SERPL-SCNC: 142 MMOL/L (ref 135–145)
TRIGL SERPL-MCNC: 116 MG/DL (ref 0–149)
TSH SERPL-ACNC: 2.86 UIU/ML (ref 0.35–5.5)

## 2024-10-07 PROCEDURE — 84443 ASSAY THYROID STIM HORMONE: CPT

## 2024-10-07 PROCEDURE — 80061 LIPID PANEL: CPT

## 2024-10-07 PROCEDURE — 36415 COLL VENOUS BLD VENIPUNCTURE: CPT

## 2024-10-07 PROCEDURE — 83036 HEMOGLOBIN GLYCOSYLATED A1C: CPT

## 2024-10-07 PROCEDURE — 80053 COMPREHEN METABOLIC PANEL: CPT

## 2025-01-13 ENCOUNTER — APPOINTMENT (OUTPATIENT)
Dept: ADMISSIONS | Facility: MEDICAL CENTER | Age: 60
End: 2025-01-13
Attending: RADIOLOGY
Payer: COMMERCIAL

## 2025-01-20 ENCOUNTER — PRE-ADMISSION TESTING (OUTPATIENT)
Dept: ADMISSIONS | Facility: MEDICAL CENTER | Age: 60
End: 2025-01-20
Attending: RADIOLOGY
Payer: COMMERCIAL

## 2025-01-20 RX ORDER — ERGOCALCIFEROL 1.25 MG/1
50000 CAPSULE, LIQUID FILLED ORAL
COMMUNITY
Start: 2024-12-26

## 2025-01-20 RX ORDER — ASPIRIN 81 MG/1
81 TABLET ORAL EVERY EVENING
COMMUNITY

## 2025-01-20 RX ORDER — ZONISAMIDE 100 MG/1
200 CAPSULE ORAL
COMMUNITY

## 2025-01-20 NOTE — OR NURSING
Pre-Admit RN appointment completed with pt, using English language line , by this RN, over the phone, for sedation procedure on 2/10/25. Discussed pre-procedure instructions, unless they have been otherwise instructed by their surgeon. Encouraged pt to increase oral intake the day prior to procedure including intake of electrolyte drinks such as Gatorade or electrolyte water. Instructed pt to have clear liquids until 2 hours prior to procedure. Discussed post-op expectations for pain, and approximate duration of time in PACU etc. Instructed pt to follow any medication instructions given by procedure MD. Pt verbalized understanding of all instructions. Pt denies any questions or concerns.

## 2025-01-23 ENCOUNTER — HOSPITAL ENCOUNTER (OUTPATIENT)
Dept: LAB | Facility: MEDICAL CENTER | Age: 60
End: 2025-01-23
Attending: FAMILY MEDICINE
Payer: COMMERCIAL

## 2025-01-23 LAB
ALBUMIN SERPL BCP-MCNC: 4.4 G/DL (ref 3.2–4.9)
ALBUMIN/GLOB SERPL: 1.6 G/DL
ALP SERPL-CCNC: 99 U/L (ref 30–99)
ALT SERPL-CCNC: 27 U/L (ref 2–50)
ANION GAP SERPL CALC-SCNC: 9 MMOL/L (ref 7–16)
AST SERPL-CCNC: 20 U/L (ref 12–45)
BILIRUB SERPL-MCNC: 0.3 MG/DL (ref 0.1–1.5)
BUN SERPL-MCNC: 20 MG/DL (ref 8–22)
CALCIUM ALBUM COR SERPL-MCNC: 9.3 MG/DL (ref 8.5–10.5)
CALCIUM SERPL-MCNC: 9.6 MG/DL (ref 8.5–10.5)
CHLORIDE SERPL-SCNC: 108 MMOL/L (ref 96–112)
CHOLEST SERPL-MCNC: 124 MG/DL (ref 100–199)
CO2 SERPL-SCNC: 25 MMOL/L (ref 20–33)
CREAT SERPL-MCNC: 0.83 MG/DL (ref 0.5–1.4)
EST. AVERAGE GLUCOSE BLD GHB EST-MCNC: 134 MG/DL
FASTING STATUS PATIENT QL REPORTED: NORMAL
GFR SERPLBLD CREATININE-BSD FMLA CKD-EPI: 81 ML/MIN/1.73 M 2
GLOBULIN SER CALC-MCNC: 2.7 G/DL (ref 1.9–3.5)
GLUCOSE SERPL-MCNC: 95 MG/DL (ref 65–99)
HBA1C MFR BLD: 6.3 % (ref 4–5.6)
HDLC SERPL-MCNC: 38 MG/DL
LDLC SERPL CALC-MCNC: 64 MG/DL
POTASSIUM SERPL-SCNC: 4.1 MMOL/L (ref 3.6–5.5)
PROT SERPL-MCNC: 7.1 G/DL (ref 6–8.2)
SODIUM SERPL-SCNC: 142 MMOL/L (ref 135–145)
TRIGL SERPL-MCNC: 110 MG/DL (ref 0–149)

## 2025-01-23 PROCEDURE — 80053 COMPREHEN METABOLIC PANEL: CPT

## 2025-01-23 PROCEDURE — 83036 HEMOGLOBIN GLYCOSYLATED A1C: CPT

## 2025-01-23 PROCEDURE — 80061 LIPID PANEL: CPT

## 2025-01-23 PROCEDURE — 36415 COLL VENOUS BLD VENIPUNCTURE: CPT

## 2025-01-28 ENCOUNTER — PRE-ADMISSION TESTING (OUTPATIENT)
Dept: ADMISSIONS | Facility: MEDICAL CENTER | Age: 60
End: 2025-01-28
Attending: RADIOLOGY
Payer: COMMERCIAL

## 2025-01-28 DIAGNOSIS — Z01.812 PRE-OPERATIVE LABORATORY EXAMINATION: ICD-10-CM

## 2025-01-28 LAB
ERYTHROCYTE [DISTWIDTH] IN BLOOD BY AUTOMATED COUNT: 47.1 FL (ref 35.9–50)
HCT VFR BLD AUTO: 42.2 % (ref 37–47)
HGB BLD-MCNC: 13.7 G/DL (ref 12–16)
INR PPP: 1.11 (ref 0.87–1.13)
MCH RBC QN AUTO: 31.4 PG (ref 27–33)
MCHC RBC AUTO-ENTMCNC: 32.5 G/DL (ref 32.2–35.5)
MCV RBC AUTO: 96.8 FL (ref 81.4–97.8)
PLATELET # BLD AUTO: 155 K/UL (ref 164–446)
PMV BLD AUTO: 11.7 FL (ref 9–12.9)
PROTHROMBIN TIME: 14.4 SEC (ref 12–14.6)
RBC # BLD AUTO: 4.36 M/UL (ref 4.2–5.4)
WBC # BLD AUTO: 5.9 K/UL (ref 4.8–10.8)

## 2025-01-28 PROCEDURE — 85610 PROTHROMBIN TIME: CPT

## 2025-01-28 PROCEDURE — 85027 COMPLETE CBC AUTOMATED: CPT

## 2025-01-28 PROCEDURE — 36415 COLL VENOUS BLD VENIPUNCTURE: CPT

## 2025-02-07 NOTE — OR NURSING
Day before procedure call completed using language line  # [id number]. Pt states understanding of instructions.

## 2025-02-10 ENCOUNTER — APPOINTMENT (OUTPATIENT)
Dept: RADIOLOGY | Facility: MEDICAL CENTER | Age: 60
End: 2025-02-10
Attending: RADIOLOGY
Payer: COMMERCIAL

## 2025-02-10 ENCOUNTER — HOSPITAL ENCOUNTER (OUTPATIENT)
Facility: MEDICAL CENTER | Age: 60
End: 2025-02-10
Attending: RADIOLOGY | Admitting: RADIOLOGY
Payer: COMMERCIAL

## 2025-02-10 VITALS
DIASTOLIC BLOOD PRESSURE: 58 MMHG | RESPIRATION RATE: 15 BRPM | WEIGHT: 156.97 LBS | OXYGEN SATURATION: 94 % | TEMPERATURE: 97.1 F | BODY MASS INDEX: 28.89 KG/M2 | HEART RATE: 66 BPM | HEIGHT: 62 IN | SYSTOLIC BLOOD PRESSURE: 98 MMHG

## 2025-02-10 DIAGNOSIS — I67.1 INTRACRANIAL ANEURYSM: ICD-10-CM

## 2025-02-10 PROCEDURE — 160002 HCHG RECOVERY MINUTES (STAT)

## 2025-02-10 PROCEDURE — 160046 HCHG PACU - 1ST 60 MINS PHASE II

## 2025-02-10 PROCEDURE — 700111 HCHG RX REV CODE 636 W/ 250 OVERRIDE (IP): Performed by: RADIOLOGY

## 2025-02-10 PROCEDURE — 700111 HCHG RX REV CODE 636 W/ 250 OVERRIDE (IP)

## 2025-02-10 PROCEDURE — 76937 US GUIDE VASCULAR ACCESS: CPT

## 2025-02-10 PROCEDURE — 160035 HCHG PACU - 1ST 60 MINS PHASE I

## 2025-02-10 PROCEDURE — 160036 HCHG PACU - EA ADDL 30 MINS PHASE I

## 2025-02-10 RX ORDER — ONDANSETRON 2 MG/ML
4 INJECTION INTRAMUSCULAR; INTRAVENOUS PRN
Status: ACTIVE | OUTPATIENT
Start: 2025-02-10 | End: 2025-02-10

## 2025-02-10 RX ORDER — MIDAZOLAM HYDROCHLORIDE 1 MG/ML
INJECTION INTRAMUSCULAR; INTRAVENOUS
Status: DISCONTINUED
Start: 2025-02-10 | End: 2025-02-10 | Stop reason: HOSPADM

## 2025-02-10 RX ORDER — MIDAZOLAM HYDROCHLORIDE 1 MG/ML
INJECTION INTRAMUSCULAR; INTRAVENOUS
Status: COMPLETED | OUTPATIENT
Start: 2025-02-10 | End: 2025-02-10

## 2025-02-10 RX ORDER — SODIUM CHLORIDE 9 MG/ML
500 INJECTION, SOLUTION INTRAVENOUS
Status: ACTIVE | OUTPATIENT
Start: 2025-02-10 | End: 2025-02-10

## 2025-02-10 RX ORDER — VERAPAMIL HYDROCHLORIDE 2.5 MG/ML
INJECTION, SOLUTION INTRAVENOUS
Status: COMPLETED
Start: 2025-02-10 | End: 2025-02-10

## 2025-02-10 RX ORDER — HEPARIN SODIUM 1000 [USP'U]/ML
INJECTION, SOLUTION INTRAVENOUS; SUBCUTANEOUS
Status: COMPLETED
Start: 2025-02-10 | End: 2025-02-10

## 2025-02-10 RX ORDER — ONDANSETRON 2 MG/ML
INJECTION INTRAMUSCULAR; INTRAVENOUS
Status: DISCONTINUED
Start: 2025-02-10 | End: 2025-02-10 | Stop reason: HOSPADM

## 2025-02-10 RX ORDER — MIDAZOLAM HYDROCHLORIDE 1 MG/ML
.5-2 INJECTION INTRAMUSCULAR; INTRAVENOUS PRN
Status: ACTIVE | OUTPATIENT
Start: 2025-02-10 | End: 2025-02-10

## 2025-02-10 RX ORDER — SODIUM CHLORIDE 9 MG/ML
INJECTION, SOLUTION INTRAVENOUS ONCE
Status: DISCONTINUED | OUTPATIENT
Start: 2025-02-10 | End: 2025-02-10 | Stop reason: HOSPADM

## 2025-02-10 RX ADMIN — FENTANYL CITRATE 50 MCG: 50 INJECTION, SOLUTION INTRAMUSCULAR; INTRAVENOUS at 09:20

## 2025-02-10 RX ADMIN — FENTANYL CITRATE 50 MCG: 50 INJECTION, SOLUTION INTRAMUSCULAR; INTRAVENOUS at 08:55

## 2025-02-10 RX ADMIN — HEPARIN SODIUM 2000 UNITS: 1000 INJECTION, SOLUTION INTRAVENOUS; SUBCUTANEOUS at 09:04

## 2025-02-10 RX ADMIN — MIDAZOLAM HYDROCHLORIDE 1 MG: 1 INJECTION, SOLUTION INTRAMUSCULAR; INTRAVENOUS at 08:55

## 2025-02-10 RX ADMIN — ONDANSETRON 4 MG: 2 INJECTION INTRAMUSCULAR; INTRAVENOUS at 09:16

## 2025-02-10 RX ADMIN — VERAPAMIL HYDROCHLORIDE 2.5 MG: 2.5 INJECTION, SOLUTION INTRAVENOUS at 09:04

## 2025-02-10 RX ADMIN — NITROGLYCERIN 1 ML: 20 INJECTION INTRAVENOUS at 09:05

## 2025-02-10 ASSESSMENT — PAIN DESCRIPTION - PAIN TYPE
TYPE: SURGICAL PAIN
TYPE: SURGICAL PAIN;ACUTE PAIN
TYPE: SURGICAL PAIN

## 2025-02-10 ASSESSMENT — FIBROSIS 4 INDEX: FIB4 SCORE: 1.47

## 2025-02-10 NOTE — OR NURSING
Arrived from PACU AXO, Pt's VSS; denies N/V; states pain is at tolerable level. Dressing CDI to R wrist.     D/c orders received. IV dc'd. Pt changed into clothing with assistance. Discharge reviewed, Pt and family verbalized understanding and questions answered. Patient states ready to d/c home. Pt dc'd in w/c with .

## 2025-02-10 NOTE — OR NURSING
Pt is aaox4, resting comfortably in [facility] on 2lpm nasal cannula. Her respirations are equal and unlabored, she is in no acute distress. She does not complain of pain or n/v. Cath site is clean, dry and intact and soft to palpation, will continue to release pressure per orders. Pt neuro and CMS intact and tolerating bedrest without issues. Will continue to monitor.     Pt's s/o updated on status and time frame with questions answered.

## 2025-02-10 NOTE — DISCHARGE INSTRUCTIONS
HOME CARE INSTRUCTIONS    ACTIVITY: Rest and take it easy for the first 24 hours.  A responsible adult is recommended to remain with you during that time.  It is normal to feel sleepy.  We encourage you to not do anything that requires balance, judgment or coordination.    FOR 24 HOURS DO NOT:  Drive, operate machinery or run household appliances.  Drink beer or alcoholic beverages.  Make important decisions or sign legal documents.    SPECIAL INSTRUCTIONS:        POST ANGIOGRAM  General Care Instructions  Maintain a bandage over the incision site for 24 hours.  It's normal to find a small bruise or dime-sized lump at the insertion site. This should disappear within a few weeks.  Do not apply lotions or powders to the site.  Do not immerse the catheter insertion site in water (bathtub/swimming) for five days. It is ok to shower 24 hours after the procedure.  You may resume your normal diet immediately; on the day of your procedure, drink 6-10 glasses of water to help flush the contrast liquid out of your system.  If the doctor inserted the catheter in your arm:  For 24 hours, DO NOT lift anything heavier than 10 pounds (approximately a gallon of milk). DO NOT do any heavy pushing, pulling, or twisting.    Medications  If your current medications need to be changed, you will be provided with an updated list of your medications prior to discharge.  If you take warfarin (Coumadin), resume taking your usual dose the evening after the procedure.  DO NOT STOP taking prescribed blood thinning (anti-platelet) medications unless instructed by your cardiologist.  These medications include:  Aspirin, Clopidogrel (Plavix), Ticagrelor (Brilinta), or Prasugrel (Effient)   If you take one of the following anticoagulants, RESUME 24 HOURS after your procedure:  Apixiban (Eliquis), Rivaroxaban (Xarelto), Dabigatran (Pradaxa), Edoxaban (Savaysa)  If you take metformin (Glucophage), RESUME 48 HOURS after your procedure.    When to call  your healthcare provider  Call your cardiologist right away at [phone] if you have any of the following:   Problems/Concerns taking any of your prescribed heart medicines.   The insertion site has increasing pain, swelling, redness, bleeding, or drainage.   Your arm or leg below where the insertion site changes color, is cool, or is numb.   You have chest pain or shortness of breath that does not go away with rest.   Your pulse feels irregular -- very slow (less than 60 beats/minute) or very fast (over 100 beats/minute).   You have dizziness, fainting, or you are very tired.   You are coughing up blood or yellow or green mucus.   You have chills or a fever over 101°F (38.3°C).    If there is bleeding at the catheter insertion site, apply pressure for 10 minutes.  If bleeding persists, call 911, and continue to hold pressure until advanced medical support arrives.        DIET: To avoid nausea, slowly advance diet as tolerated, avoiding spicy or greasy foods for the first day.  Add more substantial food to your diet according to your physician's instructions.  Babies can be fed formula or breast milk as soon as they are hungry.  INCREASE FLUIDS AND FIBER TO AVOID CONSTIPATION.    SURGICAL DRESSING/BATHING: Ok to shower, do not soak wrist in water.    MEDICATIONS: Resume taking daily medication.  Take prescribed pain medication with food.  If no medication is prescribed, you may take non-aspirin pain medication if needed.  PAIN MEDICATION CAN BE VERY CONSTIPATING.  Take a stool softener or laxative such as senokot, pericolace, or milk of magnesia if needed.      A follow-up appointment should be arranged with your doctor in 2 weeks; call to schedule.    You should CALL YOUR PHYSICIAN if you develop:  Fever greater than 101 degrees F.  Pain not relieved by medication, or persistent nausea or vomiting.  Excessive bleeding (blood soaking through dressing) or unexpected drainage from the wound.  Extreme redness or  swelling around the incision site, drainage of pus or foul smelling drainage.  Inability to urinate or empty your bladder within 8 hours.  Problems with breathing or chest pain.    You should call 911 if you develop problems with breathing or chest pain.  If you are unable to contact your doctor or surgical center, you should go to the nearest emergency room or urgent care center.  Physician's telephone #: [phone]    MILD FLU-LIKE SYMPTOMS ARE NORMAL.  YOU MAY EXPERIENCE GENERALIZED MUSCLE ACHES, THROAT IRRITATION, HEADACHE AND/OR SOME NAUSEA.    If any questions arise, call your doctor.  If your doctor is not available, please feel free to call the Surgical Center at [phone].  The Center is open Monday through Friday from 7AM to 7PM.      A registered nurse may call you a few days after your surgery to see how you are doing after your procedure.    You may also receive a survey in the mail within the next two weeks and we ask that you take a few moments to complete the survey and return it to us.  Our goal is to provide you with very good care and we value your comments.     Depression / Suicide Risk    As you are discharged from this [facility] facility, it is important to learn how to keep safe from harming yourself.    Recognize the warning signs:  Abrupt changes in personality, positive or negative- including increase in energy   Giving away possessions  Change in eating patterns- significant weight changes-  positive or negative  Change in sleeping patterns- unable to sleep or sleeping all the time   Unwillingness or inability to communicate  Depression  Unusual sadness, discouragement and loneliness  Talk of wanting to die  Neglect of personal appearance   Rebelliousness- reckless behavior  Withdrawal from people/activities they love  Confusion- inability to concentrate     If you or a loved one observes any of these behaviors or has concerns about self-harm, here's what you can do:  Talk about  it- your feelings and reasons for harming yourself  Remove any means that you might use to hurt yourself (examples: pills, rope, extension cords, firearm)  Get professional help from the community (Mental Health, Substance Abuse, psychological counseling)  Do not be alone:Call your Safe Contact- someone whom you trust who will be there for you.  Call your local CRISIS HOTLINE [phone] or [phone]  Call your local Children's Mobile Crisis Response Team [place] [phone] [phone] or "[employer]"  Call the toll free National Suicide Prevention Hotlines   National Suicide Prevention Lifeline 798-980-VKNN (1142)  National [employer] Network 800-SUICIDE (325-5204)    I acknowledge receipt and understanding of these Home Care instructions.    INSTRUCCIONES PARA EL CUIDADO EN EL HOGAR    ACTIVIDAD: Descanse y tómelo con calma richie las primeras 24 horas. Se recomienda que un adulto responsable permanezca con usted richie james tiempo. Es normal sentirse somnoliento. Le recomendamos que no sharita nada que requiera equilibrio, criterio o coordinación.    RICHIE LAS 24 HORAS NO:  Conduzca, opere maquinaria ni sharita funcionar electrodomésticos.  Lana cerveza o bebidas alcohólicas.  Marble decisiones importantes o firme documentos legales.    INSTRUCCIONES ESPECIALES:    DESPUÉS DE LA ANGIOGRAFÍA  Instrucciones generales para el cuidado   Mantenga un vendaje sobre el sitio de la incisión richie 24 horas. Es normal encontrar un pequeño hematoma o un bulto del tamaño de zuri moneda de riley centavos en el sitio de inserción. [place] debería desaparecer en unas pocas semanas.   No aplique lociones ni polvos en el sitio. No sumerja el sitio de inserción del catéter en agua (bañera/piscina) richie jeramie días. Está meghana ducharse 24 horas después del procedimiento.   Puede reanudar burrell dieta normal de inmediato; El día del procedimiento, lana de 6 a 10 vasos de agua para ayudar a eliminar el líquido de contraste de burrell  organismo.   Si el médico le insertó el catéter en el brazo:  o Audrey 24 horas, NO levante nada que pese más de 10 libras (aproximadamente un galón de leche). NO empuje, tire ni gire objetos con fuerza.    Medicamentos   Si necesita cambiar aruna medicamentos actuales, se le proporcionará zuri lista actualizada de aruna medicamentos antes del esmer.   Si kaye warfarina (Coumadin), reanude la kaye de burrell dosis habitual la noche posterior al procedimiento.   NO DEJE de kaya los medicamentos anticoagulantes (antiplaquetarios) recetados a menos que se lo indique burrell cardiólogo. Estos medicamentos incluyen:  o Aspirina, clopidogrel (Plavix), ticagrelor (Brilinta) o prasugrel (Effient)   Si kaye dorene de los siguientes anticoagulantes, REANUDE BURRELL KAYE 24 HORAS después del procedimiento:  o Apixibán (Eliquis), rivaroxabán (Xarelto), dabigatrán (Pradaxa), edoxabán (Savaysa)   Si kaye metformina (Glucophage), REANUDE BURRELL KAYE 48 HORAS después del procedimiento.    Cuándo llamar a burrell proveedor de atención médica  Llame a burrell cardiólogo de inmediato al [phone] si tiene alguno de los siguientes síntomas:  ? Problemas o inquietudes al kaya cualquiera de los medicamentos para el corazón que le recetaron.  ? El lugar de inserción tiene un dolor cada vez mayor, hinchazón, enrojecimiento, sangrado o supuración.  ? El brazo o la pierna debajo del lugar de la inserción cambia de color, está frío o entumecido.  ? Tiene dolor en el pecho o falta de aire que no desaparece con el descanso.  ? Burrell pulso se siente irregular: muy lento (menos de 60 latidos por minuto) o muy rápido (más de 100 latidos por minuto).  ? Tiene mareos, desmayos o está muy cansado.  ? Está tosiendo mekhi o mucosidad amarilla o richie.  ? Tiene escalofríos o fiebre de más de 101 °F (38,3 °C).  ? Si hay sangrado en el sitio de inserción del catéter, aplique presión audrey 10 minutos. Si el sangrado persiste, llame al 911 y continúe manteniendo la presión hasta que  llegue asistencia médica avanzada.    DIETA: Para evitar las náuseas, avance lentamente en la dieta según lo tolere, evitando los alimentos picantes o grasosos audrey el primer día. Agregue alimentos más sustanciales a burrell dieta según las instrucciones de burrell médico. Los bebés pueden ser alimentados con fórmula o leche materna tan pronto flip tengan hambre. AUMENTE LOS LÍQUIDOS Y LA FIBRA PARA EVITAR EL ESTREÑIMIENTO.    VENDAJE/BAÑO QUIRÚRGICO: Puede ducharse, no sumerja la dejah en agua.    MEDICAMENTOS: Reanude la mirna de la medicación diaria. [place] los analgésicos recetados con las comidas. Si no le recetaron ningún medicamento, puede kaya analgésicos sin aspirina si es necesario. LOS ANALGÉSICOS PUEDEN PROVOCAR MUCHO ESTREÑIMIENTO. [place] un ablandador de heces o un laxante flip senokot, pericolace o leche de magnesia si es necesario.    Debe programar zuri sandy de seguimiento con burrell médico en 2 semanas; llame para programarla.    Debe LLAMAR A BURRELL MÉDICO si presenta:  Fiebre superior a 101 grados F.  Dolor que no se leighann con medicamentos o náuseas o vómitos persistentes.  Sangrado excesivo (mekhi que empapa el vendaje) o secreción inesperada de la herida.  Enrojecimiento o hinchazón extremos alrededor del lugar de la incisión, secreción de pus o secreción con mal olor.  Incapacidad para orinar o vaciar la vejiga en 8 horas.  Problemas para respirar o dolor en el pecho.    Debe llamar al 911 si presenta problemas respiratorios o dolor en el pecho.  Si no puede comunicarse con burrell médico o centro quirúrgico, debe acudir a la wilfred de emergencias o centro de atención de urgencias más cercano. Número de teléfono del médico: [phone]    LOS SÍNTOMAS LEVES SIMILARES A LOS DE LA GRIPE SON NORMALES. PUEDE EXPERIMENTAR BINA MUSCULARES GENERALIZADOS, IRRITACIÓN DE GARGANTA, DOLOR DE ROLDAN Y/O ALGUNAS NÁUSEAS.    Si tiene alguna pregunta, llame a burrell médico. Si burrell médico no está disponible, no dude en llamar al  [facility] [phone]. [place] está abierto de lunes a viernes de 7 a. m. a 7 p. m.    Zuri enfermera titulada puede llamarlo unos días después de burrell cirugía para tacos cómo se siente después del procedimiento.    También puede recibir zuri encuesta por correo dentro de las próximas dos semanas y le pedimos que se tome unos minutos para completarla y enviárnosla. Nuestro objetivo es brindarle zuri muy buena atención y valoramos aruna comentarios.    Depresión / Riesgo de suicidio    Cuando le den el esmer de richy centro de alexander [facility], es importante que aprenda a evitar hacerse daño a sí mismo.    Reconozca las señales de advertencia:  ? Cambios abruptos en la personalidad, positivos o negativos, incluido el aumento de energía  ? Regalar posesiones  ? Cambios en los patrones de alimentación: cambios significativos de peso, positivos o negativos  ? Cambios en los patrones de sueño: no poder dormir o dormir todo el tiempo  ? Falta de voluntad o incapacidad para comunicarse  ? Depresión  ? Tristeza, desánimo y lynsey inusuales  ? Hablar de querer morir  ? Descuido de la apariencia personal  ? Rebeldía: comportamiento imprudente  ? Aislamiento de personas o actividades que ama  ? Confusión: incapacidad para concentrarse    Si usted o un ser querido observa alguno de estos comportamientos o tiene inquietudes sobre la autolesión, esto es lo que puede hacer:  ? Hablar sobre ello: aruna sentimientos y razones para hacerse daño a sí mismo  ? Elimine cualquier medio que pueda usar para hacerse daño (por ejemplo, pastillas, cuerdas, cables de extensión, gege de su).  ? Obtenga ayuda profesional de la comunidad (alexander mental, abuso de sustancias, asesoramiento psicológico).  ? No esté solo: llame a burrell contacto seguro, alguien en quien confíe y que estará allí para usted.  ? Llame a la LÍNEA DIRECTA DE CRISIS local [phone] o [phone].  ? Llame a burrell [employer] [facility] de  [facility] [phone] o [email or web address].  ? Llame a las líneas directas gratuitas de prevención del suicidio nacional.  ? Línea directa de prevención del suicidio nacional [phone] ([phone]).  ? Línea directa de la red nacional de ekaterina 800-SUICIDE ([phone]).

## 2025-02-10 NOTE — H&P
History and Physical    Date: 2/10/2025    PCP: Mat Houston M.D.          HPI: This is a 60 y.o. female who is s/p left ICA aneurysm repair    Past Medical History:   Diagnosis Date    High cholesterol     Hypertension     Myocardial infarct (HCC) 2021    Seizure (HCC) 01/22/2024    During completion of stress test       Past Surgical History:   Procedure Laterality Date    HYSTEROSCOPY WITH VIDEO DIAGNOSTIC  03/05/2009    Performed by DANIELE PERDOMO at SURGERY SAME DAY [facility] ORS    DILATION AND CURETTAGE  03/05/2009    Performed by DANIELE PERDOMO at SURGERY SAME DAY [facility] ORS    CAROTID STENT ANGIOGRAPHY         Current Facility-Administered Medications   Medication Dose Route Frequency Provider Last Rate Last Admin    lidocaine (Xylocaine) 1 % injection 0.5 mL  0.5 mL Intradermal Once PRN Zana Lemons M.D.        NS infusion   Intravenous Once Zana Lemons M.D.            Social History     Socioeconomic History    Marital status:      Spouse name: Not on file    Number of children: Not on file    Years of education: Not on file    Highest education level: Not on file   Occupational History    Not on file   Tobacco Use    Smoking status: Never    Smokeless tobacco: Never   Vaping Use    Vaping status: Never Used   Substance and Sexual Activity    Alcohol use: Never    Drug use: Never    Sexual activity: Not on file   Other Topics Concern    Not on file   Social History Narrative    ** Merged History Encounter **          Social Drivers of Health     Financial Resource Strain: Not on file   Food Insecurity: Not on file   Transportation Needs: Not on file   Physical Activity: Not on file   Stress: Not on file   Social Connections: Not on file   Intimate Partner Violence: Not on file   Housing Stability: Not on file       History reviewed. No pertinent family history.    Allergies:  Patient has no known allergies.    Review of Systems:  Negative     Physical  Exam  Constitutional:       General: She is not in acute distress.     Appearance: She is well-developed. She is not diaphoretic.   Eyes:      General: No scleral icterus.  Pulmonary:      Effort: Pulmonary effort is normal. No respiratory distress.   Abdominal:      General: There is no distension.      Palpations: Abdomen is soft.   Skin:     General: Skin is warm and dry.      Coloration: Skin is not pale.      Findings: No erythema or rash.   Neurological:      Mental Status: She is alert and oriented to person, place, and time.      Cranial Nerves: No cranial nerve deficit.      Coordination: Coordination normal.   Psychiatric:         Behavior: Behavior normal.         Thought Content: Thought content normal.         Judgment: Judgment normal.         Vital Signs  Blood Pressure: 116/73   Temperature: 36.3 °C (97.4 °F)   Pulse: 68   Respiration: 17   Pulse Oximetry: 99 %        Labs:                    Radiology:  IR-CAROTID-CEREBRAL BILATERAL    (Results Pending)             Assessment and Plan:This is a 60 y.o. who is s/p left ICA aneurysm epair    Plan: cerebral angiogram -Right radial /right groin

## 2025-02-10 NOTE — PROGRESS NOTES
Medication history reviewed with pt. Med rec is complete.  Allergies reviewed, per pt  Interviewed pt with  at bedside with permission from pt.    Pt had RX bottles at bedside, went over RX bottles and returned RX bottles back to pts  at bedside.    Patient has not had any outpatient antibiotics in the last 30 days.    Pt is not on any anticoagulants

## 2025-02-10 NOTE — OR SURGEON
Immediate Post- Operative Note        Findings: S/P left ICA aneurym      Procedure(s): cerebral angiogram      Estimated Blood Loss: Less than 5 ml        Complications: None            2/10/2025     9:27 AM     Zana Lemons M.D.

## 2025-02-18 ENCOUNTER — HOSPITAL ENCOUNTER (OUTPATIENT)
Dept: LAB | Facility: MEDICAL CENTER | Age: 60
End: 2025-02-18
Attending: PSYCHIATRY & NEUROLOGY
Payer: COMMERCIAL

## 2025-02-18 LAB
BASOPHILS # BLD AUTO: 1 % (ref 0–1.8)
BASOPHILS # BLD: 0.05 K/UL (ref 0–0.12)
EOSINOPHIL # BLD AUTO: 0.06 K/UL (ref 0–0.51)
EOSINOPHIL NFR BLD: 1.2 % (ref 0–6.9)
ERYTHROCYTE [DISTWIDTH] IN BLOOD BY AUTOMATED COUNT: 47 FL (ref 35.9–50)
HCT VFR BLD AUTO: 44.8 % (ref 37–47)
HGB BLD-MCNC: 14.4 G/DL (ref 12–16)
IMM GRANULOCYTES # BLD AUTO: 0.01 K/UL (ref 0–0.11)
IMM GRANULOCYTES NFR BLD AUTO: 0.2 % (ref 0–0.9)
LYMPHOCYTES # BLD AUTO: 2.37 K/UL (ref 1–4.8)
LYMPHOCYTES NFR BLD: 46.8 % (ref 22–41)
MCH RBC QN AUTO: 31.4 PG (ref 27–33)
MCHC RBC AUTO-ENTMCNC: 32.1 G/DL (ref 32.2–35.5)
MCV RBC AUTO: 97.8 FL (ref 81.4–97.8)
MONOCYTES # BLD AUTO: 0.33 K/UL (ref 0–0.85)
MONOCYTES NFR BLD AUTO: 6.5 % (ref 0–13.4)
NEUTROPHILS # BLD AUTO: 2.24 K/UL (ref 1.82–7.42)
NEUTROPHILS NFR BLD: 44.3 % (ref 44–72)
NRBC # BLD AUTO: 0 K/UL
NRBC BLD-RTO: 0 /100 WBC (ref 0–0.2)
PLATELET # BLD AUTO: 171 K/UL (ref 164–446)
PMV BLD AUTO: 12 FL (ref 9–12.9)
RBC # BLD AUTO: 4.58 M/UL (ref 4.2–5.4)
WBC # BLD AUTO: 5.1 K/UL (ref 4.8–10.8)

## 2025-02-18 PROCEDURE — 85025 COMPLETE CBC W/AUTO DIFF WBC: CPT

## 2025-02-18 PROCEDURE — 36415 COLL VENOUS BLD VENIPUNCTURE: CPT

## 2025-02-18 PROCEDURE — 80053 COMPREHEN METABOLIC PANEL: CPT

## 2025-02-18 PROCEDURE — 82306 VITAMIN D 25 HYDROXY: CPT

## 2025-02-19 LAB
25(OH)D3 SERPL-MCNC: 38 NG/ML (ref 30–100)
ALBUMIN SERPL BCP-MCNC: 4.4 G/DL (ref 3.2–4.9)
ALBUMIN/GLOB SERPL: 1.6 G/DL
ALP SERPL-CCNC: 97 U/L (ref 30–99)
ALT SERPL-CCNC: 27 U/L (ref 2–50)
ANION GAP SERPL CALC-SCNC: 10 MMOL/L (ref 7–16)
AST SERPL-CCNC: 18 U/L (ref 12–45)
BILIRUB SERPL-MCNC: 0.2 MG/DL (ref 0.1–1.5)
BUN SERPL-MCNC: 16 MG/DL (ref 8–22)
CALCIUM ALBUM COR SERPL-MCNC: 9.2 MG/DL (ref 8.5–10.5)
CALCIUM SERPL-MCNC: 9.5 MG/DL (ref 8.5–10.5)
CHLORIDE SERPL-SCNC: 107 MMOL/L (ref 96–112)
CO2 SERPL-SCNC: 25 MMOL/L (ref 20–33)
CREAT SERPL-MCNC: 0.78 MG/DL (ref 0.5–1.4)
GFR SERPLBLD CREATININE-BSD FMLA CKD-EPI: 87 ML/MIN/1.73 M 2
GLOBULIN SER CALC-MCNC: 2.8 G/DL (ref 1.9–3.5)
GLUCOSE SERPL-MCNC: 91 MG/DL (ref 65–99)
POTASSIUM SERPL-SCNC: 4.3 MMOL/L (ref 3.6–5.5)
PROT SERPL-MCNC: 7.2 G/DL (ref 6–8.2)
SODIUM SERPL-SCNC: 142 MMOL/L (ref 135–145)

## 2025-07-28 ENCOUNTER — HOSPITAL ENCOUNTER (OUTPATIENT)
Dept: LAB | Facility: MEDICAL CENTER | Age: 60
End: 2025-07-28
Attending: FAMILY MEDICINE
Payer: COMMERCIAL

## 2025-07-28 LAB
ALBUMIN SERPL BCP-MCNC: 4.4 G/DL (ref 3.2–4.9)
ALBUMIN/GLOB SERPL: 1.5 G/DL
ALP SERPL-CCNC: 97 U/L (ref 30–99)
ALT SERPL-CCNC: 30 U/L (ref 2–50)
ANION GAP SERPL CALC-SCNC: 11 MMOL/L (ref 7–16)
AST SERPL-CCNC: 22 U/L (ref 12–45)
BILIRUB SERPL-MCNC: 0.3 MG/DL (ref 0.1–1.5)
BUN SERPL-MCNC: 15 MG/DL (ref 8–22)
CALCIUM ALBUM COR SERPL-MCNC: 9.5 MG/DL (ref 8.5–10.5)
CALCIUM SERPL-MCNC: 9.8 MG/DL (ref 8.5–10.5)
CHLORIDE SERPL-SCNC: 107 MMOL/L (ref 96–112)
CHOLEST SERPL-MCNC: 130 MG/DL (ref 100–199)
CO2 SERPL-SCNC: 24 MMOL/L (ref 20–33)
CREAT SERPL-MCNC: 0.8 MG/DL (ref 0.5–1.4)
EST. AVERAGE GLUCOSE BLD GHB EST-MCNC: 128 MG/DL
FASTING STATUS PATIENT QL REPORTED: NORMAL
GFR SERPLBLD CREATININE-BSD FMLA CKD-EPI: 85 ML/MIN/1.73 M 2
GLOBULIN SER CALC-MCNC: 3 G/DL (ref 1.9–3.5)
GLUCOSE SERPL-MCNC: 106 MG/DL (ref 65–99)
HBA1C MFR BLD: 6.1 % (ref 4–5.6)
HDLC SERPL-MCNC: 36 MG/DL
LDLC SERPL CALC-MCNC: 73 MG/DL
POTASSIUM SERPL-SCNC: 4.6 MMOL/L (ref 3.6–5.5)
PROT SERPL-MCNC: 7.4 G/DL (ref 6–8.2)
SODIUM SERPL-SCNC: 142 MMOL/L (ref 135–145)
TRIGL SERPL-MCNC: 106 MG/DL (ref 0–149)

## 2025-07-28 PROCEDURE — 80053 COMPREHEN METABOLIC PANEL: CPT

## 2025-07-28 PROCEDURE — 80061 LIPID PANEL: CPT

## 2025-07-28 PROCEDURE — 36415 COLL VENOUS BLD VENIPUNCTURE: CPT

## 2025-07-28 PROCEDURE — 83036 HEMOGLOBIN GLYCOSYLATED A1C: CPT
